# Patient Record
Sex: MALE | Race: WHITE | NOT HISPANIC OR LATINO | URBAN - METROPOLITAN AREA
[De-identification: names, ages, dates, MRNs, and addresses within clinical notes are randomized per-mention and may not be internally consistent; named-entity substitution may affect disease eponyms.]

---

## 2017-08-18 ENCOUNTER — EMERGENCY (EMERGENCY)
Facility: HOSPITAL | Age: 52
LOS: 1 days | Discharge: HOME | End: 2017-08-18
Attending: EMERGENCY MEDICINE | Admitting: EMERGENCY MEDICINE
Payer: COMMERCIAL

## 2017-08-18 VITALS
RESPIRATION RATE: 18 BRPM | WEIGHT: 179.9 LBS | OXYGEN SATURATION: 98 % | DIASTOLIC BLOOD PRESSURE: 109 MMHG | TEMPERATURE: 98 F | HEART RATE: 90 BPM | SYSTOLIC BLOOD PRESSURE: 193 MMHG

## 2017-08-18 VITALS
DIASTOLIC BLOOD PRESSURE: 90 MMHG | HEART RATE: 66 BPM | SYSTOLIC BLOOD PRESSURE: 150 MMHG | RESPIRATION RATE: 17 BRPM | OXYGEN SATURATION: 97 % | TEMPERATURE: 98 F

## 2017-08-18 LAB
ALBUMIN SERPL ELPH-MCNC: 4.3 G/DL — SIGNIFICANT CHANGE UP (ref 3.3–5)
ALP SERPL-CCNC: 62 U/L — SIGNIFICANT CHANGE UP (ref 40–120)
ALT FLD-CCNC: 25 U/L RC — SIGNIFICANT CHANGE UP (ref 10–45)
ANION GAP SERPL CALC-SCNC: 10 MMOL/L — SIGNIFICANT CHANGE UP (ref 5–17)
AST SERPL-CCNC: 17 U/L — SIGNIFICANT CHANGE UP (ref 10–40)
BASOPHILS # BLD AUTO: 0.1 K/UL — SIGNIFICANT CHANGE UP (ref 0–0.2)
BASOPHILS NFR BLD AUTO: 0.8 % — SIGNIFICANT CHANGE UP (ref 0–2)
BILIRUB SERPL-MCNC: 0.6 MG/DL — SIGNIFICANT CHANGE UP (ref 0.2–1.2)
BUN SERPL-MCNC: 19 MG/DL — SIGNIFICANT CHANGE UP (ref 7–23)
CALCIUM SERPL-MCNC: 9.3 MG/DL — SIGNIFICANT CHANGE UP (ref 8.4–10.5)
CHLORIDE SERPL-SCNC: 98 MMOL/L — SIGNIFICANT CHANGE UP (ref 96–108)
CO2 SERPL-SCNC: 28 MMOL/L — SIGNIFICANT CHANGE UP (ref 22–31)
CREAT SERPL-MCNC: 1.05 MG/DL — SIGNIFICANT CHANGE UP (ref 0.5–1.3)
EOSINOPHIL # BLD AUTO: 0.1 K/UL — SIGNIFICANT CHANGE UP (ref 0–0.5)
EOSINOPHIL NFR BLD AUTO: 1.6 % — SIGNIFICANT CHANGE UP (ref 0–6)
GLUCOSE SERPL-MCNC: 102 MG/DL — HIGH (ref 70–99)
HCT VFR BLD CALC: 45 % — SIGNIFICANT CHANGE UP (ref 39–50)
HGB BLD-MCNC: 15.6 G/DL — SIGNIFICANT CHANGE UP (ref 13–17)
LYMPHOCYTES # BLD AUTO: 2.3 K/UL — SIGNIFICANT CHANGE UP (ref 1–3.3)
LYMPHOCYTES # BLD AUTO: 29.3 % — SIGNIFICANT CHANGE UP (ref 13–44)
MCHC RBC-ENTMCNC: 32.5 PG — SIGNIFICANT CHANGE UP (ref 27–34)
MCHC RBC-ENTMCNC: 34.7 GM/DL — SIGNIFICANT CHANGE UP (ref 32–36)
MCV RBC AUTO: 93.6 FL — SIGNIFICANT CHANGE UP (ref 80–100)
MONOCYTES # BLD AUTO: 0.9 K/UL — SIGNIFICANT CHANGE UP (ref 0–0.9)
MONOCYTES NFR BLD AUTO: 12 % — SIGNIFICANT CHANGE UP (ref 2–14)
NEUTROPHILS # BLD AUTO: 4.4 K/UL — SIGNIFICANT CHANGE UP (ref 1.8–7.4)
NEUTROPHILS NFR BLD AUTO: 56.3 % — SIGNIFICANT CHANGE UP (ref 43–77)
PLATELET # BLD AUTO: 220 K/UL — SIGNIFICANT CHANGE UP (ref 150–400)
POTASSIUM SERPL-MCNC: 4.3 MMOL/L — SIGNIFICANT CHANGE UP (ref 3.5–5.3)
POTASSIUM SERPL-SCNC: 4.3 MMOL/L — SIGNIFICANT CHANGE UP (ref 3.5–5.3)
PROT SERPL-MCNC: 7.4 G/DL — SIGNIFICANT CHANGE UP (ref 6–8.3)
RBC # BLD: 4.81 M/UL — SIGNIFICANT CHANGE UP (ref 4.2–5.8)
RBC # FLD: 10.7 % — SIGNIFICANT CHANGE UP (ref 10.3–14.5)
SODIUM SERPL-SCNC: 136 MMOL/L — SIGNIFICANT CHANGE UP (ref 135–145)
WBC # BLD: 7.9 K/UL — SIGNIFICANT CHANGE UP (ref 3.8–10.5)
WBC # FLD AUTO: 7.9 K/UL — SIGNIFICANT CHANGE UP (ref 3.8–10.5)

## 2017-08-18 PROCEDURE — 93010 ELECTROCARDIOGRAM REPORT: CPT

## 2017-08-18 PROCEDURE — 99283 EMERGENCY DEPT VISIT LOW MDM: CPT | Mod: 25

## 2017-08-18 PROCEDURE — 85027 COMPLETE CBC AUTOMATED: CPT

## 2017-08-18 PROCEDURE — 99284 EMERGENCY DEPT VISIT MOD MDM: CPT | Mod: 25

## 2017-08-18 PROCEDURE — 93005 ELECTROCARDIOGRAM TRACING: CPT

## 2017-08-18 PROCEDURE — 80053 COMPREHEN METABOLIC PANEL: CPT

## 2017-08-18 RX ORDER — LISINOPRIL 2.5 MG/1
10 TABLET ORAL DAILY
Qty: 0 | Refills: 0 | Status: DISCONTINUED | OUTPATIENT
Start: 2017-08-18 | End: 2017-08-22

## 2017-08-18 RX ORDER — AMLODIPINE BESYLATE 2.5 MG/1
1 TABLET ORAL
Qty: 7 | Refills: 0 | OUTPATIENT
Start: 2017-08-18 | End: 2017-08-25

## 2017-08-18 RX ORDER — LISINOPRIL 2.5 MG/1
1 TABLET ORAL
Qty: 7 | Refills: 0 | OUTPATIENT
Start: 2017-08-18 | End: 2017-08-25

## 2017-08-18 RX ORDER — AMLODIPINE BESYLATE 2.5 MG/1
10 TABLET ORAL ONCE
Qty: 0 | Refills: 0 | Status: COMPLETED | OUTPATIENT
Start: 2017-08-18 | End: 2017-08-18

## 2017-08-18 RX ADMIN — LISINOPRIL 10 MILLIGRAM(S): 2.5 TABLET ORAL at 18:54

## 2017-08-18 RX ADMIN — AMLODIPINE BESYLATE 10 MILLIGRAM(S): 2.5 TABLET ORAL at 17:15

## 2017-08-18 NOTE — ED PROVIDER NOTE - MEDICAL DECISION MAKING DETAILS
hypertension without signs of end organ dmg on H/P. Doubt intracranial, cardiac, or renal damage given H/P. will do basic labs, give dose of antihtn meds here, dispo to home with close PCP Follow up.

## 2017-08-18 NOTE — ED PROVIDER NOTE - ATTENDING CONTRIBUTION TO CARE
referred to emergency department by personal medical doctor, noted to have headache, resolved, improved, noted to have high blood pressure, on my exam:  (GENERAL) This is a well developed, well nourished patient who is in no apparent distress.  (HEENT) There is no signs of trauma of the head, neck, chest, or belly. The head is normocephalic.  The outer ears appears normal. (EYES) The sclera is anicteric, conjunctiva is pink, mucous membranes are moist.  (Respiratory)There is no stridor and the patient is moving air throughout the respiratory tract well.  There are no visible masses of the neck.  (CARDIAC) The rate appears to be normal. There is no JVD. (SKIN) The skin is warm and dry. (MUSCULOSKELETAL) The extremities are warm to the touch with good capillary refill and without edema. (PSYCH) The patient is cooperative and pleasant.  (NEURO) There are no obvious focal deformities on neurologic examination.

## 2017-08-18 NOTE — ED ADULT NURSE NOTE - CHPI ED SYMPTOMS NEG
no chills/no vomiting/no syncope/no chest pain/no nausea/no diaphoresis/no shortness of breath/no back pain/no dizziness/no fever/no cough

## 2017-08-18 NOTE — ED PROVIDER NOTE - PROGRESS NOTE DETAILS
- MD Cameron,PhD - Resident: patient reassessed  multiple times, amlodipine was added and later lisinopril, BP improved, will d/c home with close pcp Follow up.

## 2017-08-18 NOTE — ED PROVIDER NOTE - CARE PLAN
Principal Discharge DX:	Headache  Secondary Diagnosis:	Essential hypertension Principal Discharge DX:	Headache  Instructions for follow-up, activity and diet:	You were seen in the ER for hypertension. You must follow up with your primary physician in 24 to 48 hours. Return to the ER for any new or worsening signs/symptoms.   1) Take the antihypertensives as prescribed  Secondary Diagnosis:	Essential hypertension

## 2017-08-18 NOTE — ED PROVIDER NOTE - PLAN OF CARE
You were seen in the ER for hypertension. You must follow up with your primary physician in 24 to 48 hours. Return to the ER for any new or worsening signs/symptoms.   1) Take the antihypertensives as prescribed

## 2017-08-18 NOTE — ED PROVIDER NOTE - OBJECTIVE STATEMENT
51 male history of htn on clonidine here for hypertension. 1 week ago started having migraines, similar in quality but more severe, and associated with HTN. Headache better since returned from [place]. Now no headache. Has never been on BP meds until yesterday, Primary (Enrique Santizo) started him on Clonidine. No changes in urination, no chest pain, no shortness of breath.

## 2017-08-18 NOTE — ED PROVIDER NOTE - PHYSICAL EXAMINATION
Gen: NAD, AOx3, non-toxic // Head: NCAT // HEENT: EOMI, oral mucosa moist, normal conjunctiva // Lung: CTAB, no respiratory distress, no wheezes/rhonchi/rales B/L, speaking in full sentences. // CV: RRR, no murmurs, rubs or gallops // Abd: soft, NTND, no guarding, no CVA tenderness // MSK: no visible deformities // Neuro: No focal sensory or motor deficits // Skin: Warm, well perfused, no rash // Psych: normal affect. ~Coby Barnes M.D., Ph.D. -Resident

## 2017-08-18 NOTE — ED ADULT NURSE NOTE - OBJECTIVE STATEMENT
51 male history of htn on clonidine here for hypertension. 1 week ago started having migraines, similar in quality but more severe, and associated with HTN. Headache better since returned from [place]. Now no headache. Has never been on BP meds until yesterday, Primary (Enrique Santizo) started him on Clonidine. No changes in urination, no chest pain, no shortness of breath.

## 2017-09-05 ENCOUNTER — INPATIENT (INPATIENT)
Facility: HOSPITAL | Age: 52
LOS: 3 days | Discharge: HOME | DRG: 948 | End: 2017-09-09
Attending: INTERNAL MEDICINE | Admitting: INTERNAL MEDICINE
Payer: COMMERCIAL

## 2017-09-05 VITALS
SYSTOLIC BLOOD PRESSURE: 149 MMHG | TEMPERATURE: 98 F | OXYGEN SATURATION: 99 % | HEART RATE: 91 BPM | RESPIRATION RATE: 16 BRPM | DIASTOLIC BLOOD PRESSURE: 98 MMHG | WEIGHT: 179.9 LBS

## 2017-09-05 DIAGNOSIS — E89.6 POSTPROCEDURAL ADRENOCORTICAL (-MEDULLARY) HYPOFUNCTION: Chronic | ICD-10-CM

## 2017-09-05 DIAGNOSIS — Z90.49 ACQUIRED ABSENCE OF OTHER SPECIFIED PARTS OF DIGESTIVE TRACT: Chronic | ICD-10-CM

## 2017-09-05 DIAGNOSIS — R53.1 WEAKNESS: ICD-10-CM

## 2017-09-05 LAB
ALBUMIN SERPL ELPH-MCNC: 4.5 G/DL — SIGNIFICANT CHANGE UP (ref 3.3–5)
ALP SERPL-CCNC: 80 U/L — SIGNIFICANT CHANGE UP (ref 40–120)
ALT FLD-CCNC: 53 U/L RC — HIGH (ref 10–45)
ANION GAP SERPL CALC-SCNC: 15 MMOL/L — SIGNIFICANT CHANGE UP (ref 5–17)
APPEARANCE UR: CLEAR — SIGNIFICANT CHANGE UP
APTT BLD: 22 SEC — LOW (ref 27.5–37.4)
AST SERPL-CCNC: 32 U/L — SIGNIFICANT CHANGE UP (ref 10–40)
BASOPHILS # BLD AUTO: 0.1 K/UL — SIGNIFICANT CHANGE UP (ref 0–0.2)
BASOPHILS NFR BLD AUTO: 0.6 % — SIGNIFICANT CHANGE UP (ref 0–2)
BILIRUB SERPL-MCNC: 0.6 MG/DL — SIGNIFICANT CHANGE UP (ref 0.2–1.2)
BILIRUB UR-MCNC: NEGATIVE — SIGNIFICANT CHANGE UP
BUN SERPL-MCNC: 29 MG/DL — HIGH (ref 7–23)
CALCIUM SERPL-MCNC: 10.5 MG/DL — SIGNIFICANT CHANGE UP (ref 8.4–10.5)
CHLORIDE SERPL-SCNC: 90 MMOL/L — LOW (ref 96–108)
CO2 SERPL-SCNC: 23 MMOL/L — SIGNIFICANT CHANGE UP (ref 22–31)
COLOR SPEC: YELLOW — SIGNIFICANT CHANGE UP
CREAT SERPL-MCNC: 1.62 MG/DL — HIGH (ref 0.5–1.3)
DIFF PNL FLD: NEGATIVE — SIGNIFICANT CHANGE UP
EOSINOPHIL # BLD AUTO: 0.1 K/UL — SIGNIFICANT CHANGE UP (ref 0–0.5)
EOSINOPHIL NFR BLD AUTO: 0.5 % — SIGNIFICANT CHANGE UP (ref 0–6)
EPI CELLS # UR: SIGNIFICANT CHANGE UP /HPF
GAS PNL BLDV: SIGNIFICANT CHANGE UP
GLUCOSE SERPL-MCNC: 118 MG/DL — HIGH (ref 70–99)
GLUCOSE UR QL: NEGATIVE — SIGNIFICANT CHANGE UP
HCT VFR BLD CALC: 49.8 % — SIGNIFICANT CHANGE UP (ref 39–50)
HGB BLD-MCNC: 17.7 G/DL — HIGH (ref 13–17)
HYALINE CASTS # UR AUTO: ABNORMAL
INR BLD: 1.12 RATIO — SIGNIFICANT CHANGE UP (ref 0.88–1.16)
KETONES UR-MCNC: NEGATIVE — SIGNIFICANT CHANGE UP
LEUKOCYTE ESTERASE UR-ACNC: NEGATIVE — SIGNIFICANT CHANGE UP
LYMPHOCYTES # BLD AUTO: 1.4 K/UL — SIGNIFICANT CHANGE UP (ref 1–3.3)
LYMPHOCYTES # BLD AUTO: 9 % — LOW (ref 13–44)
MCHC RBC-ENTMCNC: 32.5 PG — SIGNIFICANT CHANGE UP (ref 27–34)
MCHC RBC-ENTMCNC: 35.7 GM/DL — SIGNIFICANT CHANGE UP (ref 32–36)
MCV RBC AUTO: 91.3 FL — SIGNIFICANT CHANGE UP (ref 80–100)
MONOCYTES # BLD AUTO: 1.7 K/UL — HIGH (ref 0–0.9)
MONOCYTES NFR BLD AUTO: 11 % — SIGNIFICANT CHANGE UP (ref 2–14)
NEUTROPHILS # BLD AUTO: 12.2 K/UL — HIGH (ref 1.8–7.4)
NEUTROPHILS NFR BLD AUTO: 78.9 % — HIGH (ref 43–77)
NITRITE UR-MCNC: NEGATIVE — SIGNIFICANT CHANGE UP
PH UR: 6 — SIGNIFICANT CHANGE UP (ref 5–8)
PLATELET # BLD AUTO: 246 K/UL — SIGNIFICANT CHANGE UP (ref 150–400)
POTASSIUM SERPL-MCNC: 5.3 MMOL/L — SIGNIFICANT CHANGE UP (ref 3.5–5.3)
POTASSIUM SERPL-SCNC: 5.3 MMOL/L — SIGNIFICANT CHANGE UP (ref 3.5–5.3)
PROT SERPL-MCNC: 8.8 G/DL — HIGH (ref 6–8.3)
PROT UR-MCNC: 30 MG/DL
PROTHROM AB SERPL-ACNC: 12.1 SEC — SIGNIFICANT CHANGE UP (ref 9.8–12.7)
RBC # BLD: 5.45 M/UL — SIGNIFICANT CHANGE UP (ref 4.2–5.8)
RBC # FLD: 10.3 % — SIGNIFICANT CHANGE UP (ref 10.3–14.5)
RBC CASTS # UR COMP ASSIST: SIGNIFICANT CHANGE UP /HPF (ref 0–2)
SODIUM SERPL-SCNC: 128 MMOL/L — LOW (ref 135–145)
SP GR SPEC: 1.02 — SIGNIFICANT CHANGE UP (ref 1.01–1.02)
UROBILINOGEN FLD QL: NEGATIVE — SIGNIFICANT CHANGE UP
WBC # BLD: 15.5 K/UL — HIGH (ref 3.8–10.5)
WBC # FLD AUTO: 15.5 K/UL — HIGH (ref 3.8–10.5)
WBC UR QL: SIGNIFICANT CHANGE UP /HPF (ref 0–5)

## 2017-09-05 PROCEDURE — 99285 EMERGENCY DEPT VISIT HI MDM: CPT | Mod: 25

## 2017-09-05 PROCEDURE — 71010: CPT | Mod: 26

## 2017-09-05 PROCEDURE — 93010 ELECTROCARDIOGRAM REPORT: CPT

## 2017-09-05 PROCEDURE — 70450 CT HEAD/BRAIN W/O DYE: CPT | Mod: 26

## 2017-09-05 RX ORDER — SPIRONOLACTONE 25 MG/1
25 TABLET, FILM COATED ORAL DAILY
Qty: 0 | Refills: 0 | Status: DISCONTINUED | OUTPATIENT
Start: 2017-09-05 | End: 2017-09-05

## 2017-09-05 RX ORDER — SODIUM CHLORIDE 9 MG/ML
1000 INJECTION INTRAMUSCULAR; INTRAVENOUS; SUBCUTANEOUS ONCE
Qty: 0 | Refills: 0 | Status: COMPLETED | OUTPATIENT
Start: 2017-09-05 | End: 2017-09-05

## 2017-09-05 RX ORDER — IBUPROFEN 200 MG
3 TABLET ORAL
Qty: 0 | Refills: 0 | COMMUNITY

## 2017-09-05 RX ORDER — ACETAMINOPHEN 500 MG
650 TABLET ORAL ONCE
Qty: 0 | Refills: 0 | Status: COMPLETED | OUTPATIENT
Start: 2017-09-05 | End: 2017-09-05

## 2017-09-05 RX ORDER — HYDROCHLOROTHIAZIDE 25 MG
0 TABLET ORAL
Qty: 0 | Refills: 0 | COMMUNITY

## 2017-09-05 RX ORDER — SPIRONOLACTONE 25 MG/1
0 TABLET, FILM COATED ORAL
Qty: 0 | Refills: 0 | COMMUNITY

## 2017-09-05 RX ORDER — HYDRALAZINE HCL 50 MG
10 TABLET ORAL ONCE
Qty: 0 | Refills: 0 | Status: COMPLETED | OUTPATIENT
Start: 2017-09-05 | End: 2017-09-05

## 2017-09-05 RX ORDER — LISINOPRIL 2.5 MG/1
5 TABLET ORAL DAILY
Qty: 0 | Refills: 0 | Status: DISCONTINUED | OUTPATIENT
Start: 2017-09-05 | End: 2017-09-05

## 2017-09-05 RX ORDER — AMLODIPINE BESYLATE 2.5 MG/1
5 TABLET ORAL ONCE
Qty: 0 | Refills: 0 | Status: COMPLETED | OUTPATIENT
Start: 2017-09-05 | End: 2017-09-05

## 2017-09-05 RX ORDER — SPIRONOLACTONE 25 MG/1
50 TABLET, FILM COATED ORAL
Qty: 0 | Refills: 0 | Status: DISCONTINUED | OUTPATIENT
Start: 2017-09-05 | End: 2017-09-09

## 2017-09-05 RX ORDER — ACETAMINOPHEN 500 MG
1000 TABLET ORAL ONCE
Qty: 0 | Refills: 0 | Status: COMPLETED | OUTPATIENT
Start: 2017-09-05 | End: 2017-09-05

## 2017-09-05 RX ORDER — ASPIRIN/CALCIUM CARB/MAGNESIUM 324 MG
325 TABLET ORAL ONCE
Qty: 0 | Refills: 0 | Status: COMPLETED | OUTPATIENT
Start: 2017-09-05 | End: 2017-09-05

## 2017-09-05 RX ORDER — SPIRONOLACTONE 25 MG/1
2 TABLET, FILM COATED ORAL
Qty: 0 | Refills: 0 | COMMUNITY

## 2017-09-05 RX ORDER — HEPARIN SODIUM 5000 [USP'U]/ML
5000 INJECTION INTRAVENOUS; SUBCUTANEOUS EVERY 12 HOURS
Qty: 0 | Refills: 0 | Status: DISCONTINUED | OUTPATIENT
Start: 2017-09-05 | End: 2017-09-09

## 2017-09-05 RX ORDER — METOCLOPRAMIDE HCL 10 MG
10 TABLET ORAL ONCE
Qty: 0 | Refills: 0 | Status: COMPLETED | OUTPATIENT
Start: 2017-09-05 | End: 2017-09-05

## 2017-09-05 RX ORDER — AMLODIPINE BESYLATE 2.5 MG/1
5 TABLET ORAL DAILY
Qty: 0 | Refills: 0 | Status: DISCONTINUED | OUTPATIENT
Start: 2017-09-06 | End: 2017-09-06

## 2017-09-05 RX ORDER — INFLUENZA VIRUS VACCINE 15; 15; 15; 15 UG/.5ML; UG/.5ML; UG/.5ML; UG/.5ML
0.5 SUSPENSION INTRAMUSCULAR ONCE
Qty: 0 | Refills: 0 | Status: COMPLETED | OUTPATIENT
Start: 2017-09-05 | End: 2017-09-05

## 2017-09-05 RX ORDER — ROSUVASTATIN CALCIUM 5 MG/1
1 TABLET ORAL
Qty: 0 | Refills: 0 | COMMUNITY

## 2017-09-05 RX ADMIN — Medication 1000 MILLIGRAM(S): at 11:30

## 2017-09-05 RX ADMIN — Medication 650 MILLIGRAM(S): at 23:00

## 2017-09-05 RX ADMIN — SPIRONOLACTONE 50 MILLIGRAM(S): 25 TABLET, FILM COATED ORAL at 17:51

## 2017-09-05 RX ADMIN — AMLODIPINE BESYLATE 5 MILLIGRAM(S): 2.5 TABLET ORAL at 17:48

## 2017-09-05 RX ADMIN — HEPARIN SODIUM 5000 UNIT(S): 5000 INJECTION INTRAVENOUS; SUBCUTANEOUS at 17:48

## 2017-09-05 RX ADMIN — Medication 650 MILLIGRAM(S): at 22:17

## 2017-09-05 RX ADMIN — Medication 10 MILLIGRAM(S): at 10:41

## 2017-09-05 RX ADMIN — Medication 400 MILLIGRAM(S): at 10:46

## 2017-09-05 RX ADMIN — SODIUM CHLORIDE 1000 MILLILITER(S): 9 INJECTION INTRAMUSCULAR; INTRAVENOUS; SUBCUTANEOUS at 10:40

## 2017-09-05 RX ADMIN — Medication 10 MILLIGRAM(S): at 22:17

## 2017-09-05 RX ADMIN — Medication 325 MILLIGRAM(S): at 17:42

## 2017-09-05 NOTE — H&P ADULT - REASON FOR ADMISSION
"SPIRITUAL HEALTH SERVICES  SPIRITUAL ASSESSMENT Progress Note  [facility] ([place]) 5C    Per pt request, provided a blessing service.  Just prior to Steph receiving her cells, facilitated a blessing along with her cousin, April.  As we closed, Steph said \"now it's in God's hands.\"  Introduced the 24/7 availability of [facility] and the plan for periodic follow-up.      Will visit this pt 1x / wk.  Visits are available on request.    Mario Donnelly M.Div.  Staff   Pager [phone]    " weakness

## 2017-09-05 NOTE — H&P ADULT - NSHPLABSRESULTS_GEN_ALL_CORE
17.7   15.5  )-----------( 246      ( 05 Sep 2017 10:24 )             49.8           128<L>  |  90<L>  |  29<H>  ----------------------------<  118<H>  5.3   |  23  |  1.62<H>    Ca    10.5      05 Sep 2017 10:24  Phos  3.0       Mg     1.8         TPro  8.8<H>  /  Alb  4.5  /  TBili  0.6  /  DBili  x   /  AST  32  /  ALT  53<H>  /  AlkPhos  80                Urinalysis Basic - ( 05 Sep 2017 13:38 )    Color: Yellow / Appearance: Clear / S.025 / pH: x  Gluc: x / Ketone: Negative  / Bili: Negative / Urobili: Negative   Blood: x / Protein: 30 mg/dL / Nitrite: Negative   Leuk Esterase: Negative / RBC: 3-5 /HPF / WBC 6-10 /HPF   Sq Epi: x / Non Sq Epi: OCC /HPF / Bacteria: x        PT/INR - ( 05 Sep 2017 10:24 )   PT: 12.1 sec;   INR: 1.12 ratio         PTT - ( 05 Sep 2017 10:24 )  PTT:22.0 sec    Lactate Trend      CARDIAC MARKERS ( 05 Sep 2017 10:24 )  x     / <0.01 ng/mL / 85 U/L / x     / 1.8 ng/mL        CAPILLARY BLOOD GLUCOSE  109 (05 Sep 2017 10:24)  < from: Xray Chest 1 View AP- PORTABLE-Urgent (17 @ 15:30) >        INTERPRETATION:  CLINICAL INFORMATION: Weakness and fatigue.    EXAM: Single frontal view chest radiograph.    COMPARISON:  None    FINDINGS:   LINES/TUBES AND POSTSURGICAL CHANGES:  There are scattered surgical clips in the left upper quadrant.    HEART AND MEDIASTINUM:  The cardiac silhouette cannot be accurately evaluated on this projection.    LUNGS/PLEURA:  Clear lungs.  No pneumothorax.   There is no pleural effusion.     OSSEOUS STRUCTURES:  No acute osseous pathology.      IMPRESSION:   Clear lungs.    < end of copied text >

## 2017-09-05 NOTE — H&P ADULT - ASSESSMENT
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

## 2017-09-05 NOTE — ED ADULT NURSE REASSESSMENT NOTE - NS ED NURSE REASSESS COMMENT FT1
During Attending MD Pedro assessment, patient suddenly became lethargic, diaphoretic and stated he doesn't feel right. Patient vomited x3 while MD Pedro at bedside. Patient revitaled, EKG performed, finger stick done.

## 2017-09-05 NOTE — ED PROVIDER NOTE - MEDICAL DECISION MAKING DETAILS
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 no focal symptoms and pt already with lengthy workup including CT. will check basic labs, give fluids, and headache cocktail. will attempt to get read on outpatient CT. reassess.

## 2017-09-05 NOTE — ED PROVIDER NOTE - PHYSICAL EXAMINATION
Karen Forbes M.D.:   patient awake alert seen lying on stretcher tired appearing NAD .   LUNGS Coarse breath sounds b/l..   CARD RRR no m/r/g.    Abdomen soft NT ND no rebound no guarding no CVA tenderness.   EXT WWP no edema no calf tenderness CV 2+DP/PT bilaterally.   neuro A&Ox3 CN 2-12 intact, gross motor and sensory intact in b/l UE and LE. +horizontal nystagmus  b/l gait normal.    skin warm and dry no rash  HEENT: moist mucous membranes, PERRL, EOMI

## 2017-09-05 NOTE — ED PROVIDER NOTE - OBJECTIVE STATEMENT
Karen Forbes M.D: 52yoM hx Conn syndrome, p/w weakness. started with HTN SBP 200s one month ago. started on BP meds. presented to ER 2 weeks ago for still feeling generally unwell started on more medicine.  saw endocrinologist last week got blood work and CT on saturday, was diagnosed with Conn syndrome prior to results of these tests- started on spironolactone. over the last days increasing fatigue, weakness, foggy, lightheaded. +HA. no vision change. no cp, sob. +nausea. no vomiting/diarrhea/abd pain.

## 2017-09-05 NOTE — ED ADULT NURSE NOTE - OBJECTIVE STATEMENT
53 y/o male patient presents ambulatory to ED with complaint of generalized body weakness, headache and nausea. Patient recently seen in ED for HTN, followed up with PMD dx with "Conn Syndrome", started on spironolactone and HCTZ. Patient states he has a follow up with PMD this evening but wasn't feeling well at work. Per patient he had bloodwork and CT scan perfomred 2 days ago outpatient- unknown results. Patient denies SOB and CP, no V/D, afebrile in ED, no lightheadedness/dizziness, no numbness or tingling, no abdominal pain or tenderness, no syncope, no cough, no URI

## 2017-09-05 NOTE — H&P ADULT - NSHPPHYSICALEXAM_GEN_ALL_CORE
PHYSICAL EXAMINATION:  Vital Signs Last 24 Hrs  T(C): 36.4 (05 Sep 2017 09:38), Max: 36.4 (05 Sep 2017 09:38)  T(F): 97.6 (05 Sep 2017 09:38), Max: 97.6 (05 Sep 2017 09:38)  HR: 74 (05 Sep 2017 13:04) (74 - 92)  BP: 158/100 (05 Sep 2017 13:04) (118/93 - 168/113)  BP(mean): --  RR: 17 (05 Sep 2017 13:04) (16 - 17)  SpO2: 97% (05 Sep 2017 13:04) (97% - 99%)  CAPILLARY BLOOD GLUCOSE  109 (05 Sep 2017 10:24)          GENERAL: NAD, well-groomed, well-developed  HEAD:  atraumatic, normocephalic  EYES: sclera anicteric  ENMT: mucous membranes moist  NECK: supple, No JVD  CHEST/LUNG: clear to auscultation bilaterally; no rales, rhonchi, or wheezing b/l  HEART: normal S1, S2  ABDOMEN: BS+, soft, ND, NT   EXTREMITIES:  pulses palpable; no clubbing, cyanosis, or edema b/l LEs  NEURO: awake, alert, interactive; moves all extremities  SKIN: no rashes or lesions

## 2017-09-05 NOTE — H&P ADULT - NSHPSOCIALHISTORY_GEN_ALL_CORE
Social History:    Marital Status:  ( x )    (   ) Single    (   )    (  )   Occupation:   Lives with: (  ) alone  (  ) children   (  ) spouse   (  ) parents  (  ) other    Substance Use (street drugs): ( x ) never used  (  ) other:  Tobacco Usage:  (  x ) never smoked   (   ) former smoker   (   ) current smoker  (     ) pack years  (        ) last cigarette date  Alcohol Usage: denies    (     ) Advanced Directives: (     ) None    (      ) DNR    (     ) DNI    (     ) Health Care Proxy:

## 2017-09-05 NOTE — H&P ADULT - NSHPREVIEWOFSYSTEMS_GEN_ALL_CORE
REVIEW OF SYSTEMS:    CONSTITUTIONAL: + weakness, no fevers or chills  EYES/ENT: No visual changes;  No vertigo or throat pain   NECK: No pain or stiffness  RESPIRATORY: No cough, wheezing, hemoptysis; No shortness of breath  CARDIOVASCULAR: No chest pain or palpitations  GASTROINTESTINAL: No abdominal or epigastric pain. No nausea, vomiting, or hematemesis; No diarrhea or constipation. No melena or hematochezia.  GENITOURINARY: No dysuria, frequency or hematuria  NEUROLOGICAL: No numbness or weakness  SKIN: No itching, burning, rashes, or lesions   All other review of systems is negative unless indicated above.

## 2017-09-05 NOTE — ED PROVIDER NOTE - ATTENDING CONTRIBUTION TO CARE
52M hx htn hld Conn syndrome migraines presents to the ED for weakness. Pt has been feeling weak for the past few days. generalized weakness. associated headache and dizziness - feels off balance. no f/c/cp/sob/abd pain. While examining patient in room became diaphoretic and multiple episodes of vomiting. Concern for lab abnormalities vs vertigo - will obtain labs ct head ekg symptom control and reassess    Constitutional: No fever or chills  Eyes: No visual changes  HEENT: No throat pain  CV: No chest pain  Resp: No SOB no cough  GI: No abd pain, + nausea and vomiting  : No dysuria  MSK: No musculoskeletal pain  Skin: No rash  Neuro: + headache + dizziness + weakness    Constitutional: mild distress AAOx3  Eyes: PERRLA EOMI  Head: Normocephalic atraumatic  Mouth: MMM  Cardiac: regular rate   Resp: Lungs CTAB  GI: Abd s/nt/nd  Neuro: CN2-12 intact mild horizontal nystagmus fatigable - no disdiadokokenesis normal strength and sensation.   Skin: No rashes     Ben Pedro M.D., Attending Physician

## 2017-09-05 NOTE — H&P ADULT - HISTORY OF PRESENT ILLNESS
52yoM hx Conn syndrome, p/w weakness. started with HTN SBP 200s one month ago. started on BP meds. presented to ER 2 weeks ago for still feeling generally unwell started on more medicine.  saw endocrinologist last week got blood work and CT on saturday, was diagnosed with Conn syndrome prior to results of these tests- started on spironolactone. over the last days increasing fatigue, weakness, foggy, lightheaded. +HA. no vision change. no cp, sob. +nausea. no vomiting/diarrhea/abd pain.

## 2017-09-06 ENCOUNTER — TRANSCRIPTION ENCOUNTER (OUTPATIENT)
Age: 52
End: 2017-09-06

## 2017-09-06 DIAGNOSIS — E78.00 PURE HYPERCHOLESTEROLEMIA, UNSPECIFIED: ICD-10-CM

## 2017-09-06 DIAGNOSIS — I15.2 HYPERTENSION SECONDARY TO ENDOCRINE DISORDERS: ICD-10-CM

## 2017-09-06 DIAGNOSIS — E87.1 HYPO-OSMOLALITY AND HYPONATREMIA: ICD-10-CM

## 2017-09-06 LAB
ACTH SER-ACNC: 40 PG/ML — SIGNIFICANT CHANGE UP (ref 0–46)
ALBUMIN SERPL ELPH-MCNC: 4.4 G/DL — SIGNIFICANT CHANGE UP (ref 3.3–5)
ALP SERPL-CCNC: 79 U/L — SIGNIFICANT CHANGE UP (ref 40–120)
ALT FLD-CCNC: 41 U/L RC — SIGNIFICANT CHANGE UP (ref 10–45)
ANION GAP SERPL CALC-SCNC: 17 MMOL/L — SIGNIFICANT CHANGE UP (ref 5–17)
AST SERPL-CCNC: 22 U/L — SIGNIFICANT CHANGE UP (ref 10–40)
BASOPHILS # BLD AUTO: 0.1 K/UL — SIGNIFICANT CHANGE UP (ref 0–0.2)
BASOPHILS NFR BLD AUTO: 0.5 % — SIGNIFICANT CHANGE UP (ref 0–2)
BILIRUB SERPL-MCNC: 0.6 MG/DL — SIGNIFICANT CHANGE UP (ref 0.2–1.2)
BUN SERPL-MCNC: 29 MG/DL — HIGH (ref 7–23)
CALCIUM SERPL-MCNC: 10.5 MG/DL — SIGNIFICANT CHANGE UP (ref 8.4–10.5)
CHLORIDE SERPL-SCNC: 92 MMOL/L — LOW (ref 96–108)
CO2 SERPL-SCNC: 23 MMOL/L — SIGNIFICANT CHANGE UP (ref 22–31)
CORTIS AM PEAK SERPL-MCNC: 18.5 UG/DL — HIGH (ref 6–18.4)
CREAT SERPL-MCNC: 1.67 MG/DL — HIGH (ref 0.5–1.3)
EOSINOPHIL # BLD AUTO: 0 K/UL — SIGNIFICANT CHANGE UP (ref 0–0.5)
EOSINOPHIL NFR BLD AUTO: 0.2 % — SIGNIFICANT CHANGE UP (ref 0–6)
GLUCOSE SERPL-MCNC: 106 MG/DL — HIGH (ref 70–99)
HCT VFR BLD CALC: 50.5 % — HIGH (ref 39–50)
HGB BLD-MCNC: 18.1 G/DL — HIGH (ref 13–17)
LYMPHOCYTES # BLD AUTO: 1.9 K/UL — SIGNIFICANT CHANGE UP (ref 1–3.3)
LYMPHOCYTES # BLD AUTO: 13.5 % — SIGNIFICANT CHANGE UP (ref 13–44)
MCHC RBC-ENTMCNC: 32.9 PG — SIGNIFICANT CHANGE UP (ref 27–34)
MCHC RBC-ENTMCNC: 35.8 GM/DL — SIGNIFICANT CHANGE UP (ref 32–36)
MCV RBC AUTO: 91.8 FL — SIGNIFICANT CHANGE UP (ref 80–100)
MONOCYTES # BLD AUTO: 1.6 K/UL — HIGH (ref 0–0.9)
MONOCYTES NFR BLD AUTO: 11.5 % — SIGNIFICANT CHANGE UP (ref 2–14)
NEUTROPHILS # BLD AUTO: 10.3 K/UL — HIGH (ref 1.8–7.4)
NEUTROPHILS NFR BLD AUTO: 74.3 % — SIGNIFICANT CHANGE UP (ref 43–77)
PLATELET # BLD AUTO: 273 K/UL — SIGNIFICANT CHANGE UP (ref 150–400)
POTASSIUM SERPL-MCNC: 4.4 MMOL/L — SIGNIFICANT CHANGE UP (ref 3.5–5.3)
POTASSIUM SERPL-SCNC: 4.4 MMOL/L — SIGNIFICANT CHANGE UP (ref 3.5–5.3)
PROT SERPL-MCNC: 8.5 G/DL — HIGH (ref 6–8.3)
RBC # BLD: 5.5 M/UL — SIGNIFICANT CHANGE UP (ref 4.2–5.8)
RBC # FLD: 10.6 % — SIGNIFICANT CHANGE UP (ref 10.3–14.5)
SODIUM SERPL-SCNC: 132 MMOL/L — LOW (ref 135–145)
WBC # BLD: 13.9 K/UL — HIGH (ref 3.8–10.5)
WBC # FLD AUTO: 13.9 K/UL — HIGH (ref 3.8–10.5)

## 2017-09-06 PROCEDURE — 99253 IP/OBS CNSLTJ NEW/EST LOW 45: CPT | Mod: GC

## 2017-09-06 RX ORDER — ATORVASTATIN CALCIUM 80 MG/1
80 TABLET, FILM COATED ORAL AT BEDTIME
Qty: 0 | Refills: 0 | Status: DISCONTINUED | OUTPATIENT
Start: 2017-09-06 | End: 2017-09-09

## 2017-09-06 RX ORDER — ACETAMINOPHEN 500 MG
650 TABLET ORAL ONCE
Qty: 0 | Refills: 0 | Status: COMPLETED | OUTPATIENT
Start: 2017-09-06 | End: 2017-09-06

## 2017-09-06 RX ORDER — AMLODIPINE BESYLATE 2.5 MG/1
10 TABLET ORAL DAILY
Qty: 0 | Refills: 0 | Status: DISCONTINUED | OUTPATIENT
Start: 2017-09-06 | End: 2017-09-09

## 2017-09-06 RX ORDER — ACETAMINOPHEN 500 MG
650 TABLET ORAL EVERY 6 HOURS
Qty: 0 | Refills: 0 | Status: DISCONTINUED | OUTPATIENT
Start: 2017-09-06 | End: 2017-09-09

## 2017-09-06 RX ORDER — DEXAMETHASONE 0.5 MG/5ML
1 ELIXIR ORAL ONCE
Qty: 0 | Refills: 0 | Status: COMPLETED | OUTPATIENT
Start: 2017-09-06 | End: 2017-09-06

## 2017-09-06 RX ADMIN — Medication 650 MILLIGRAM(S): at 15:51

## 2017-09-06 RX ADMIN — HEPARIN SODIUM 5000 UNIT(S): 5000 INJECTION INTRAVENOUS; SUBCUTANEOUS at 06:27

## 2017-09-06 RX ADMIN — ATORVASTATIN CALCIUM 80 MILLIGRAM(S): 80 TABLET, FILM COATED ORAL at 23:40

## 2017-09-06 RX ADMIN — SPIRONOLACTONE 50 MILLIGRAM(S): 25 TABLET, FILM COATED ORAL at 06:39

## 2017-09-06 RX ADMIN — Medication 1 MILLIGRAM(S): at 23:38

## 2017-09-06 RX ADMIN — AMLODIPINE BESYLATE 10 MILLIGRAM(S): 2.5 TABLET ORAL at 14:11

## 2017-09-06 RX ADMIN — Medication 650 MILLIGRAM(S): at 10:24

## 2017-09-06 RX ADMIN — Medication 650 MILLIGRAM(S): at 14:51

## 2017-09-06 RX ADMIN — HEPARIN SODIUM 5000 UNIT(S): 5000 INJECTION INTRAVENOUS; SUBCUTANEOUS at 18:24

## 2017-09-06 RX ADMIN — AMLODIPINE BESYLATE 5 MILLIGRAM(S): 2.5 TABLET ORAL at 06:27

## 2017-09-06 RX ADMIN — Medication 650 MILLIGRAM(S): at 09:24

## 2017-09-06 RX ADMIN — SPIRONOLACTONE 50 MILLIGRAM(S): 25 TABLET, FILM COATED ORAL at 18:24

## 2017-09-06 NOTE — CONSULT NOTE ADULT - SUBJECTIVE AND OBJECTIVE BOX
[facility]  [phone]  NEPHROLOGY      INITIAL CONSULT NOTE  --------------------------------------------------------------------------------  HPI:    HPI:  52yoM hx Conn syndrome, p/w weakness. started with HTN SBP 200s one month ago. started on BP meds. presented to ER 2 weeks ago for still feeling generally ace-I and CCB added.  saw endocrinologist last week got blood work and CT on saturday with iv contrast per pt, was diagnosed with Conn syndrome prior to results of these tests- started on spironolactone.       PAST HISTORY  --------------------------------------------------------------------------------  PAST MEDICAL & SURGICAL HISTORY:  Migraines  High cholesterol  Conn syndrome  Essential hypertension  History of appendectomy  History of partial adrenalectomy    FAMILY HISTORY:  no hx of thyroid or pancreatic cancer    PAST SOCIAL HISTORY: no tobacco    ALLERGIES & MEDICATIONS  --------------------------------------------------------------------------------  Allergies    No Known Allergies    Intolerances      Standing Inpatient Medications  heparin  Injectable 5000 Unit(s) SubCutaneous every 12 hours  spironolactone 50 milliGRAM(s) Oral two times a day  dexamethasone     Tablet 1 milliGRAM(s) Oral once  amLODIPine   Tablet 10 milliGRAM(s) Oral daily  atorvastatin 80 milliGRAM(s) Oral at bedtime    PRN Inpatient Medications  acetaminophen   Tablet. 650 milliGRAM(s) Oral every 6 hours PRN      REVIEW OF SYSTEMS  --------------------------------------------------------------------------------  Gen: +fatigue, fevers/chills,   Skin: No rashes  Head/Eyes/Ears/Mouth: currently No headache; Normal hearing; Normal vision w/o blurriness; No sinus pain/discomfort, sore throat  Respiratory: No dyspnea, cough, wheezing, hemoptysis  CV: No chest pain, PND, orthopnea  GI: No abdominal pain, diarrhea, constipation, nausea, vomiting, melena, hematochezia  : No increased frequency, dysuria, hematuria, nocturia  Neuro: PTA + dizziness/lightheadedness, weakness,- seizures, -numbness, - tingling  Heme: No easy bruising or bleeding      All other systems were reviewed and are negative, except as noted.    VITALS/PHYSICAL EXAM  --------------------------------------------------------------------------------  T(C): 36.8 (09-06-17 @ 21:17), Max: 37.1 (09-06-17 @ 09:03)  HR: 77 (09-06-17 @ 21:17) (77 - 99)  BP: 144/97 (09-06-17 @ 21:17) (144/97 - 180/79)  RR: 17 (09-06-17 @ 21:17) (17 - 18)  SpO2: 97% (09-06-17 @ 21:17) (95% - 97%)  Wt(kg): --  Height (cm): 175.26 (09-05-17 @ 18:37)  Weight (kg): 81.6 (09-05-17 @ 18:37)  BMI (kg/m2): 26.6 (09-05-17 @ 18:37)  BSA (m2): 1.97 (09-05-17 @ 18:37)      09-05-17 @ 07:01  -  09-06-17 @ 07:00  --------------------------------------------------------  IN: 200 mL / OUT: 300 mL / NET: -100 mL    09-06-17 @ 07:01  -  09-06-17 @ 22:54  --------------------------------------------------------  IN: 980 mL / OUT: 0 mL / NET: 980 mL      Physical Exam:  	Gen: NAD, well-appearing  	no jvd, supple neck,   	Pulm: CTA B/L no rales or ronchi  	CV: RRR, S1S2; no rub  	Abd: +BS, soft, nontender/nondistended  	: No suprapubic tenderness  	LE: Warm, no clubbing, intact strength; no edema  	Neuro: No focal deficits, intact gait  	Psych: Normal affect and mood  	  LABS/STUDIES  --------------------------------------------------------------------------------              18.1   13.9  >-----------<  273      [09-06-17 @ 11:52]              50.5     132  |  92  |  29  ----------------------------<  106      [09-06-17 @ 11:52]  4.4   |  23  |  1.67        Ca     10.5     [09-06-17 @ 11:52]      Mg     1.8     [09-05-17 @ 10:24]      Phos  3.0     [09-05-17 @ 10:24]    TPro  8.5  /  Alb  4.4  /  TBili  0.6  /  DBili  x   /  AST  22  /  ALT  41  /  AlkPhos  79  [09-06-17 @ 11:52]    PT/INR: PT 12.1 , INR 1.12       [09-05-17 @ 10:24]  PTT: 22.0       [09-05-17 @ 10:24]    Troponin <0.01      [09-05-17 @ 10:24]  CK 85      [09-05-17 @ 10:24]    Creatinine Trend:  SCr 1.67 [09-06 @ 11:52]  SCr 1.62 [09-05 @ 10:24]  SCr 1.05 [08-18 @ 17:20]    Urinalysis - [09-05-17 @ 13:38]      Color Yellow / Appearance Clear / SG 1.025 / pH 6.0      Gluc Negative / Ketone Negative  / Bili Negative / Urobili Negative       Blood Negative / Protein 30 / Leuk Est Negative / Nitrite Negative      RBC 3-5 / WBC 6-10 / Hyaline 2-5 / Gran  / Sq Epi  / Non Sq Epi OCC / Bacteria

## 2017-09-06 NOTE — DISCHARGE NOTE ADULT - CARE PROVIDERS DIRECT ADDRESSES
,DirectAddress_Unknown,DirectAddress_Unknown ,DirectAddress_Unknown,DirectAddress_Unknown,[email or web address]

## 2017-09-06 NOTE — CONSULT NOTE ADULT - ATTENDING COMMENTS
Pt. 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 Now w/ worsening BP over past few weeks, recently started on Aldactone by PCP. Pt. bumped up dose over past few days from 100mg to 300mg PO daily, now p/w N/V and found to be hyponatremic.  -Decrease Aldactone back to home dose of 50mg PO BID, increase amlodipine to 10mg Po daily. Monitor BMP.  -Will evaluate for other hormonal hypersecretion- plasma catecholamines/ metanephrine ordered by PMD, results pending as of yesterday. Will get 1mg dex suppression test  -Will follow w/ you.

## 2017-09-06 NOTE — CONSULT NOTE ADULT - ASSESSMENT
51 yo M w/ hx Conn syndrome s/p L adrenalectomy 3 years ago p/w N/V and foggy, fatigued feeling for several days with severe htn and placed on aldactone. now with uncontrolled htn and MARIUM   1- MARIUM  2- CONN syndrome  3- hypertensive urgency     suspect MARIUM due iv contrast leading to contrast induced nephropathy vs due to malignant htn.   cont with aldactone 50mg bid and norvasc 10mg daily   hold acei and diuretics for now.   agree with cont endo work up

## 2017-09-06 NOTE — DISCHARGE NOTE ADULT - CARE PLAN
See Care Plan under Instructions Principal Discharge DX:	Weakness  Goal:	Improved  Instructions for follow-up, activity and diet:	Continue to increase activity by walking  Follow up with Dr. Linares next week  Secondary Diagnosis:	Essential hypertension  Goal:	maintain goal reading of 140 systolic  Instructions for follow-up, activity and diet:	Follow up with your medical doctor to establish long term blood pressure treatment goals.  Secondary Diagnosis:	High cholesterol  Goal:	stable  Instructions for follow-up, activity and diet:	Continue Crestor and dietary modifications  Secondary Diagnosis:	Hyponatremia  Goal:	normal level  Instructions for follow-up, activity and diet:	follow with Nephrology as discussed  Secondary Diagnosis:	Conn syndrome  Goal:	stable  Instructions for follow-up, activity and diet:	Continue work up with Dr. Heard Principal Discharge DX:	Weakness  Goal:	Improved  Instructions for follow-up, activity and diet:	Continue to increase activity by walking  Follow up with Dr. Linares next week  Secondary Diagnosis:	Essential hypertension  Goal:	maintain goal reading of 140 systolic  Instructions for follow-up, activity and diet:	Follow up with your medical doctor to establish long term blood pressure treatment goals.  Secondary Diagnosis:	High cholesterol  Goal:	stable  Instructions for follow-up, activity and diet:	Continue Crestor and dietary modifications  Secondary Diagnosis:	Hyponatremia  Goal:	normal level  Instructions for follow-up, activity and diet:	follow with Nephrology as discussed  Secondary Diagnosis:	Conn syndrome  Goal:	stable  Instructions for follow-up, activity and diet:	Continue work up with Dr. Heard and Dr. Santo next week

## 2017-09-06 NOTE — DISCHARGE NOTE ADULT - MEDICATION SUMMARY - MEDICATIONS TO TAKE
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    Crestor 20 mg oral tablet  -- 1 tab(s) by mouth once a day (at bedtime)  -- Indication: For High cholesterol    metoprolol tartrate 50 mg oral tablet  -- 1 tab(s) by mouth 2 times a day  -- 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    amLODIPine 10 mg oral tablet  -- 1 tab(s) by mouth once a day  -- 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    Crestor 20 mg oral tablet  -- 1 tab(s) by mouth once a day (at bedtime)  -- Indication: For High cholesterol    metoprolol tartrate 50 mg oral tablet  -- 1 tab(s) by mouth 2 times a day  -- 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    amlodipine 10 mg oral tablet  -- 1 tab(s) by mouth once a day  -- 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 I will START or STAY ON the medications listed below when I get home from the hospital:    May return to work on monday 9/11/17  -- 1   -- Indication: For Weakness    spironolactone 50 mg oral tablet  -- 2 tab(s) by mouth 3 times a day  -- Indication: For Conn syndrome    ibuprofen 200 mg oral capsule  -- 3 cap(s) by mouth 4 times a day, As Needed  -- Indication: For pain    Crestor 20 mg oral tablet  -- 1 tab(s) by mouth once a day (at bedtime)  -- Indication: For High cholesterol    metoprolol tartrate 50 mg oral tablet  -- 1 tab(s) by mouth 2 times a day  -- 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    amLODIPine 10 mg oral tablet  -- 1 tab(s) by mouth once a day  -- 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 I will START or STAY ON the medications listed below when I get home from the hospital:    May return to work on monday 9/11/17  -- 1   -- Indication: For Weakness    spironolactone 50 mg oral tablet  -- 2 tab(s) by mouth 3 times a day  -- Indication: For Conn syndrome    ibuprofen 200 mg oral capsule  -- 3 cap(s) by mouth 4 times a day, As Needed  -- Indication: For pain    Crestor 20 mg oral tablet  -- 1 tab(s) by mouth once a day (at bedtime)  -- Indication: For High cholesterol    metoprolol tartrate 50 mg oral tablet  -- 1 tab(s) by mouth 2 times a day  -- Indication: For Essential hypertension    amLODIPine 10 mg oral tablet  -- 1 tab(s) by mouth once a day  -- 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

## 2017-09-06 NOTE — DISCHARGE NOTE ADULT - NS AS DC FOLLOWUP STROKE INST
Smoking Cessation/Influenza vaccination ([facility] Pub Date: August 19, 2014) Influenza vaccination ([facility] Pub Date: August 19, 2014)

## 2017-09-06 NOTE — DISCHARGE NOTE ADULT - HOSPITAL COURSE
52 m with   weakness  HTN better controlled- started Amlodipine 10.  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  MARIUM?- follow, improving   cathecolamine / metanephrine 24 h urine( was not done as OTP).  DC home with follow with endocrine svc, nephrology and PMD. QA  Santhosh Linares MD pager [phone] 52 m with   weakness  HTN better controlled- started Amlodipine 10.  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  MARIUM?- follow, improving   cathecolamine / metanephrine 24 h urine( was not done as OTP).  DC home with follow with endocrine svc, nephrology and PMD. QA Medically stable.  Santhosh Linares MD pager [phone]

## 2017-09-06 NOTE — DISCHARGE NOTE ADULT - CARE PROVIDER_API CALL
Santhosh Linares (MD), Internal Medicine  [address] [place] [address]  Phone: [phone]  Fax: [phone]    Mildred Heard (DO), Nephrology  [address] [address]  Phone: [phone]  Fax: [phone] Santhosh Linares (MD), Internal Medicine  [id number] [place] [address]  Phone: [phone]  Fax: [phone]    Mildred Heard (DO), Nephrology  [address] [address]  Phone: [phone]  Fax: [phone]    Kem Santo (DO), Internal Medicine  [address] [place] [address]  Phone: [phone]  Fax: [phone]

## 2017-09-06 NOTE — PROGRESS NOTE ADULT - ASSESSMENT
52 m with   weakness  HTN control- start Amlodipine 10.  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  MARIUM?- follow  Further action as per clinical course   aSnthosh Linares MD pager [phone]

## 2017-09-06 NOTE — DISCHARGE NOTE ADULT - PLAN OF CARE
Improved Continue to increase activity by walking  Follow up with Dr. Linares next week maintain goal reading of 140 systolic Follow up with your medical doctor to establish long term blood pressure treatment goals. stable Continue Crestor and dietary modifications normal level follow with Nephrology as discussed Continue work up with Dr. Heard Continue work up with Dr. Heard and Dr. Santo next week

## 2017-09-06 NOTE — DISCHARGE NOTE ADULT - PATIENT PORTAL LINK FT
“You can access the Follow[employer] Patient Portal, offered by [facility], by registering with the following website: [email or web address]”

## 2017-09-06 NOTE — DISCHARGE NOTE ADULT - MEDICATION SUMMARY - MEDICATIONS TO STOP TAKING
I will STOP taking the medications listed below when I get home from the hospital:    lisinopril 5 mg oral tablet  -- 1 tab(s) by mouth once a day x 7 days  -- Do not take this drug if you are pregnant.  It is very important that you take or use this exactly as directed.  Do not skip doses or discontinue unless directed by your doctor.  Some non-prescription drugs may aggravate your condition.  Read all labels carefully.  If a warning appears, check with your doctor before taking.    spironolactone  --  by mouth    hydroCHLOROthiazide  --  by mouth I will STOP taking the medications listed below when I get home from the hospital:    lisinopril 5 mg oral tablet  -- 1 tab(s) by mouth once a day x 7 days  -- Do not take this drug if you are pregnant.  It is very important that you take or use this exactly as directed.  Do not skip doses or discontinue unless directed by your doctor.  Some non-prescription drugs may aggravate your condition.  Read all labels carefully.  If a warning appears, check with your doctor before taking.    spironolactone  --  by mouth    hydrochlorothiazide  --  by mouth

## 2017-09-06 NOTE — DISCHARGE NOTE ADULT - PROVIDER TOKENS
TOKEN:'[id number]:'[id number]' TOKEN:'[id number]:'[id number]',TOKEN:'[id number]'

## 2017-09-06 NOTE — DISCHARGE NOTE ADULT - ADDITIONAL INSTRUCTIONS
Follow with Dr. Santo from Endocrine  Dr. Heard form Nephrology and Dr. Monroe from internal medicine next week

## 2017-09-06 NOTE — PROGRESS NOTE ADULT - SUBJECTIVE AND OBJECTIVE BOX
Patient is a 52y old  Male who presents with a chief complaint of weakness (06 Sep 2017 09:49)      SUBJECTIVE / OVERNIGHT EVENTS: Comfortable without new complaints. Family at bedside.   Review of Systems  chest pain no  palpitations no  sob no  nausea no  headache no    MEDICATIONS  (STANDING):  heparin  Injectable 5000 Unit(s) SubCutaneous every 12 hours  spironolactone 50 milliGRAM(s) Oral two times a day  dexamethasone     Tablet 1 milliGRAM(s) Oral once  amLODIPine   Tablet 10 milliGRAM(s) Oral daily  atorvastatin 80 milliGRAM(s) Oral at bedtime    MEDICATIONS  (PRN):  acetaminophen   Tablet. 650 milliGRAM(s) Oral every 6 hours PRN Moderate Pain (4 - 6)          PHYSICAL EXAM:  GENERAL: NAD, well-developed  HEAD:  Atraumatic, Normocephalic  EYES: EOMI, PERRLA, conjunctiva and sclera clear  NECK: Supple, No JVD  CHEST/LUNG: Clear to auscultation bilaterally; No wheeze  HEART: Regular rate and rhythm; No murmurs, rubs, or gallops  ABDOMEN: Soft, Nontender, Nondistended; Bowel sounds present  EXTREMITIES:  2+ Peripheral Pulses, No clubbing, cyanosis, or edema  PSYCH: AAOx3  NEUROLOGY: non-focal  SKIN: No rashes or lesions    LABS:                        18.1   13.9  )-----------( 273      ( 06 Sep 2017 11:52 )             50.5     -    132<L>  |  92<L>  |  29<H>  ----------------------------<  106<H>  4.4   |  23  |  1.67<H>    Ca    10.5      06 Sep 2017 11:52  Phos  3.0     -  Mg     1.8         TPro  8.5<H>  /  Alb  4.4  /  TBili  0.6  /  DBili  x   /  AST  22  /  ALT  41  /  AlkPhos  79  -    PT/INR - ( 05 Sep 2017 10:24 )   PT: 12.1 sec;   INR: 1.12 ratio         PTT - ( 05 Sep 2017 10:24 )  PTT:22.0 sec  CARDIAC MARKERS ( 05 Sep 2017 10:24 )  x     / <0.01 ng/mL / 85 U/L / x     / 1.8 ng/mL      Urinalysis Basic - ( 05 Sep 2017 13:38 )    Color: Yellow / Appearance: Clear / S.025 / pH: x  Gluc: x / Ketone: Negative  / Bili: Negative / Urobili: Negative   Blood: x / Protein: 30 mg/dL / Nitrite: Negative   Leuk Esterase: Negative / RBC: 3-5 /HPF / WBC 6-10 /HPF   Sq Epi: x / Non Sq Epi: OCC /HPF / Bacteria: x        RADIOLOGY & ADDITIONAL TESTS:    Imaging Personally Reviewed:    Consultant(s) Notes Reviewed:      Care Discussed with Consultants/Other Providers:

## 2017-09-06 NOTE — CONSULT NOTE ADULT - PROBLEM SELECTOR RECOMMENDATION 9
- Likely 2/2 hyperaldosteronism   - - Likely 2/2 benign adrenal adenoma  - No surgical intervention at this time as pt only with one adrenal gland, medical management - Likely 2/2 R benign adrenal adenoma which increased in size from 5/14  - No surgical intervention at this time as pt only with one adrenal gland, medical management recommended  - Hold ACE given pt w/ new MARIUM - Likely 2/2 R benign adrenal adenoma which increased in size from 5/14  - No surgical intervention at this time as pt only with one adrenal gland, medical management recommended  - Hold ACE given pt w/ new MARIUM  - F/u results of outpt catecholamine and metanephrines  - Obtain 1mg dexamethasone suppression test - Likely 2/2 R benign adrenal adenoma which increased in size from 5/14  - Hold ACE given pt w/ new MARIUM  - F/u results of outpt catecholamine and metanephrines  - Obtain 1mg dexamethasone suppression test

## 2017-09-06 NOTE — CONSULT NOTE ADULT - PROBLEM SELECTOR RECOMMENDATION 2
- Decrease spironolactone to 50mg BID as pt was taking 150mg BID at home  - Cont w/ amlodipine 5mg daily  - Hold ACE given pt w/ new MARIUM - Decrease spironolactone to 50mg BID as pt was taking 150mg BID at home  - Cont w/ amlodipine 5mg daily  - Consider adding amiloride as pt is not tolerating higher doses of spironolactone  - Hold ACE given pt w/ new MARIUM - Decrease spironolactone to 50mg BID as pt was taking 150mg BID at home  - Cont w/ amlodipine 5mg daily  - Consider adding amiloride or triamterene low dose as pt is not tolerating higher doses of spironolactone  - Daily BMP to monitor - Decrease spironolactone to 50mg BID as pt was taking 150mg BID at home  - Cont w/ amlodipine 5mg daily, can increase amlodipine to 10mg daily   - Consider switching aldosterone to amiloride or triamterene low dose as pt's BP not improving on aldosterone  - Daily BMP to monitor - Decrease spironolactone to 50mg BID as pt was taking 150mg BID at home  - Cont w/ amlodipine 5mg daily, can increase amlodipine to 10mg daily   - Consider switching aldosterone to amiloride or triamterene low dose if pt's BP not improving on aldosterone, and can't tolerate higher doses.  - Daily BMP to monitor

## 2017-09-06 NOTE — CONSULT NOTE ADULT - ASSESSMENT
Pt is a 51 yo M w/ hx Conn syndrome s/p L adrenalectomy 3 years ago p/w N/V, fatigue feeling for several days, found to be hypertensive, hyponatremic in the setting of increasing spironolactone.

## 2017-09-06 NOTE — CONSULT NOTE ADULT - SUBJECTIVE AND OBJECTIVE BOX
Reason For Consult: Conn Syndrome    HPI:  Pt is a 51 yo M w/ hx Conn syndrome s/p L adrenalectomy 3 years ago p/w N/V and foggy, fatigued feeling for several days. Pt explains that one month ago he was in [place] when he started to develop more severe headaches. He checked his BP which was elevated to 200/110. He followed up with his PCP where his BP was 180/110. He was started on several BP medications with no improvement. He continued to have headaches with HTN. Pt went to his endocrinologist (Dr. Colunga) who started him on spironolactone, believed that the R adrenal gland overworking to compensate for L adrenalectomy as per pt. Pt was initially on 50mg BID but over the last week increased his dose to 150 BID. Pt's BP was not improving, he began to feel nauseous and more lethargic the day of admission. Pt explains that he had similar symptoms 3 years ago at which point he was worked up and found to have a L adrenal mass which was removed. Pt explains that he had interval improvement. Pt denies blurry vision, CP, SOB, + L abd pain, diarrhea, dysuria, fevers/chills, LE pain or edema. Pt's labs from 2 weeks ago show Na and K wnl.     PAST MEDICAL & SURGICAL HISTORY:  Migraines  High cholesterol  Conn syndrome  Essential hypertension  History of appendectomy  History of partial adrenalectomy    FAMILY HISTORY:  No pertinent family history in first degree relatives    Social History: No smoking, drinkings, drugs. Pt works as a .    Outpatient Medications: lisinopril 5 daily, HCTZ 12.5 daily, spironolactone 50 BID    MEDICATIONS  (STANDING):  heparin  Injectable 5000 Unit(s) SubCutaneous every 12 hours  spironolactone 50 milliGRAM(s) Oral two times a day  amLODIPine   Tablet 5 milliGRAM(s) Oral daily    Allergies    No Known Allergies    Review of Systems:  Constitutional: No fever  Eyes: No new blurry vision  Neuro: No tremors  HEENT: No pain  Cardiovascular: No chest pain, palpitations  Respiratory: No SOB, no cough  GI: + NAUSEA, L ABD PAIN  : No dysuria  Skin: no rash  Psych: no depression  Endocrine: no polyuria, polydipsia  Hem/lymph: no swelling  Osteoporosis: no fractures    ALL OTHER SYSTEMS REVIEWED AND NEGATIVE    PHYSICAL EXAM:  VITALS: T(C): 37.1 (09-06-17 @ 09:03)  T(F): 98.8 (09-06-17 @ 09:03), Max: 98.8 (09-06-17 @ 09:03)  HR: 98 (09-06-17 @ 09:55) (74 - 99)  BP: 162/98 (09-06-17 @ 09:55) (141/102 - 180/79)  RR:  (17 - 18)  SpO2:  (94% - 98%)  Wt(kg): --    GENERAL: NAD, well-groomed, well-developed  EYES: No proptosis, no lid lag, anicteric  HEENT:  Atraumatic, Normocephalic, moist mucous membranes  THYROID: Normal size, no palpable nodules  RESPIRATORY: Clear to auscultation bilaterally; No rales, rhonchi, wheezing, or rubs  CARDIOVASCULAR: +TACHYCARDIA w/ regular rhythm; No murmurs; no peripheral edema  GI: Soft, +LUQ TENDERNESS, non distended, normal bowel sounds  SKIN: Dry, intact, No rashes or lesions  MUSCULOSKELETAL: Full range of motion, normal strength  NEURO: sensation intact, extraocular movements intact, no tremor, normal reflexes  PSYCH: Alert and oriented x 3, normal affect, normal mood  CUSHING'S SIGNS: no striae    CAPILLARY BLOOD GLUCOSE  109 (09-05 @ 10:24)                          17.7   15.5  )-----------( 246      ( 05 Sep 2017 10:24 )             49.8       09-05    128<L>  |  90<L>  |  29<H>  ----------------------------<  118<H>  5.3   |  23  |  1.62<H>    EGFR if : 57<L>  EGFR if non : 49<L>    Ca    10.5      09-05  Mg     1.8     09-05  Phos  3.0     09-05    TPro  8.8<H>  /  Alb  4.5  /  TBili  0.6  /  DBili  x   /  AST  32  /  ALT  53<H>  /  AlkPhos  80  09-05    Cortisol AM, Serum (09.06.17 @ 07:49)    Cortisol AM, Serum: 18.5: Note reference range change for CORTAM and CORTPM. ug/dL    Radiology:    Xray Chest 1 View AP- PORTABLE-Urgent (09.05.17 @ 15:30)  EXAM:  CHEST-PORTABLE URGENT                            PROCEDURE DATE:  09/05/2017            INTERPRETATION:  CLINICAL INFORMATION: Weakness and fatigue.    EXAM: Single frontal view chest radiograph.    COMPARISON:  None    FINDINGS:   LINES/TUBES AND POSTSURGICAL CHANGES:  There are scattered surgical clips in the left upper quadrant.    HEART AND MEDIASTINUM:  The cardiac silhouette cannot be accurately evaluated on this projection.    LUNGS/PLEURA:  Clear lungs.  No pneumothorax.   There is no pleural effusion.     OSSEOUS STRUCTURES:  No acute osseous pathology.      IMPRESSION:   Clear lungs.        CT Head No Cont (09.05.17 @ 12:29)   EXAM:  CT BRAIN                            PROCEDURE DATE:  09/05/2017        INTERPRETATION:  Noncontrast CT of the brain.    CLINICAL INDICATION:  Headaches nausea syncope    TECHNIQUE : Axial CT scanning of the brain was obtained from the skull   base to the vertex without the administration of intravenous contrast.      COMPARISON: None available    FINDINGS:  No acute hemorrhage or infarct is identified. No   hydrocephalus, midline shift or extra-axial collections identified. No   calvarial fracture is noted. Georgette graph a right sphenoid sinus polyp   versus retention cyst is noted. The paranasal sinuses and tympanomastoid   cavities are otherwise free of acute disease.    Impression:    No acute hemorrhage or infarct. Reason For Consult: Conn Syndrome    HPI:  Pt is a 53 yo M w/ hx Conn syndrome s/p L adrenalectomy 3 years ago p/w N/V and foggy, fatigued feeling for several days. Pt explains that one month ago he was in [place] when he started to develop more severe headaches. He checked his BP which was elevated to 200/110. He followed up with his PCP where his BP was 180/110. He was started on several BP medications with no improvement. He continued to have headaches with HTN. Pt went to his endocrinologist (Dr. Colunga) who started him on spironolactone, believed that the R adrenal gland overworking to compensate for L adrenalectomy as per pt. Pt was initially on 50mg BID but over the last week increased his dose to 150 BID. Pt's BP was not improving, he began to feel nauseous and more lethargic the day of admission. Pt explains that he had similar symptoms 3 years ago at which point he was worked up and found to have a L adrenal mass which was removed. Pt explains that he had interval improvement. Pt denies blurry vision, CP, SOB, + L abd pain, diarrhea, dysuria, fevers/chills, LE pain or edema. Pt's labs from 2 weeks ago show Na and K wnl. Pt had outpt CT scan done during the last week, which showed a R adrenal lesion with washout features diagnostic for benign adenoma, currently measuring 2.5cm, enlarged since 5/14 when it was 1cm.     PAST MEDICAL & SURGICAL HISTORY:  Migraines  High cholesterol  Conn syndrome  Essential hypertension  History of appendectomy  History of partial adrenalectomy    FAMILY HISTORY:  No pertinent family history in first degree relatives    Social History: No smoking, drinkings, drugs. Pt works as a .    Outpatient Medications: lisinopril 5 daily, HCTZ 12.5 daily, spironolactone 50 BID    MEDICATIONS  (STANDING):  heparin  Injectable 5000 Unit(s) SubCutaneous every 12 hours  spironolactone 50 milliGRAM(s) Oral two times a day  amLODIPine   Tablet 5 milliGRAM(s) Oral daily    Allergies    No Known Allergies    Review of Systems:  Constitutional: No fever  Eyes: No new blurry vision  Neuro: No tremors  HEENT: No pain  Cardiovascular: No chest pain, palpitations  Respiratory: No SOB, no cough  GI: + NAUSEA, L ABD PAIN  : No dysuria  Skin: no rash  Psych: no depression  Endocrine: no polyuria, polydipsia  Hem/lymph: no swelling  Osteoporosis: no fractures    ALL OTHER SYSTEMS REVIEWED AND NEGATIVE    PHYSICAL EXAM:  VITALS: T(C): 37.1 (09-06-17 @ 09:03)  T(F): 98.8 (09-06-17 @ 09:03), Max: 98.8 (09-06-17 @ 09:03)  HR: 98 (09-06-17 @ 09:55) (74 - 99)  BP: 162/98 (09-06-17 @ 09:55) (141/102 - 180/79)  RR:  (17 - 18)  SpO2:  (94% - 98%)  Wt(kg): --    GENERAL: NAD, well-groomed, well-developed  EYES: No proptosis, no lid lag, anicteric  HEENT:  Atraumatic, Normocephalic, moist mucous membranes  THYROID: Normal size, no palpable nodules  RESPIRATORY: Clear to auscultation bilaterally; No rales, rhonchi, wheezing, or rubs  CARDIOVASCULAR: +TACHYCARDIA w/ regular rhythm; No murmurs; no peripheral edema  GI: Soft, +LUQ TENDERNESS, non distended, normal bowel sounds  SKIN: Dry, intact, No rashes or lesions  MUSCULOSKELETAL: Full range of motion, normal strength  NEURO: sensation intact, extraocular movements intact, no tremor, normal reflexes  PSYCH: Alert and oriented x 3, normal affect, normal mood  CUSHING'S SIGNS: no striae    CAPILLARY BLOOD GLUCOSE  109 (09-05 @ 10:24)                          17.7   15.5  )-----------( 246      ( 05 Sep 2017 10:24 )             49.8       09-05    128<L>  |  90<L>  |  29<H>  ----------------------------<  118<H>  5.3   |  23  |  1.62<H>    EGFR if : 57<L>  EGFR if non : 49<L>    Ca    10.5      09-05  Mg     1.8     09-05  Phos  3.0     09-05    TPro  8.8<H>  /  Alb  4.5  /  TBili  0.6  /  DBili  x   /  AST  32  /  ALT  53<H>  /  AlkPhos  80  09-05    Cortisol AM, Serum (09.06.17 @ 07:49)    Cortisol AM, Serum: 18.5: Note reference range change for CORTAM and CORTPM. ug/dL    Radiology:    Xray Chest 1 View AP- PORTABLE-Urgent (09.05.17 @ 15:30)  EXAM:  CHEST-PORTABLE URGENT                            PROCEDURE DATE:  09/05/2017            INTERPRETATION:  CLINICAL INFORMATION: Weakness and fatigue.    EXAM: Single frontal view chest radiograph.    COMPARISON:  None    FINDINGS:   LINES/TUBES AND POSTSURGICAL CHANGES:  There are scattered surgical clips in the left upper quadrant.    HEART AND MEDIASTINUM:  The cardiac silhouette cannot be accurately evaluated on this projection.    LUNGS/PLEURA:  Clear lungs.  No pneumothorax.   There is no pleural effusion.     OSSEOUS STRUCTURES:  No acute osseous pathology.      IMPRESSION:   Clear lungs.        CT Head No Cont (09.05.17 @ 12:29)   EXAM:  CT BRAIN                            PROCEDURE DATE:  09/05/2017        INTERPRETATION:  Noncontrast CT of the brain.    CLINICAL INDICATION:  Headaches nausea syncope    TECHNIQUE : Axial CT scanning of the brain was obtained from the skull   base to the vertex without the administration of intravenous contrast.      COMPARISON: None available    FINDINGS:  No acute hemorrhage or infarct is identified. No   hydrocephalus, midline shift or extra-axial collections identified. No   calvarial fracture is noted. Georgette graph a right sphenoid sinus polyp   versus retention cyst is noted. The paranasal sinuses and tympanomastoid   cavities are otherwise free of acute disease.    Impression:    No acute hemorrhage or infarct. Reason For Consult: Conn Syndrome    HPI:  Pt is a 53 yo M w/ hx Conn syndrome s/p L adrenalectomy 3 years ago p/w N/V and foggy, fatigued feeling for several days. Pt explains that one month ago he was in [place] when he started to develop more severe headaches. He checked his BP which was elevated to 200/110. He followed up with his PCP where his BP was 180/110. He was started on several BP medications with no improvement. He continued to have headaches with HTN. Pt went to his endocrinologist (Dr. Colunga) who started him on spironolactone. Pt was initially on 50mg BID but over the last week increased his dose to 150 BID. Pt's BP was not improving, he began to feel nauseous and more lethargic the day of admission. Pt explains that he had similar symptoms 3 years ago at which point he was worked up and found to have a L adrenal mass which was removed. Pt explains that he had interval improvement. Pt denies blurry vision, CP, SOB, + L abd pain, diarrhea, dysuria, fevers/chills, LE pain or edema. Pt's labs from 2 weeks ago show Na and K wnl. Pt had outpt CT scan done during the last week, which showed a R adrenal lesion with washout features diagnostic for benign adenoma, currently measuring 2.5cm, enlarged since 5/14 when it was 1cm.     PAST MEDICAL & SURGICAL HISTORY:  Migraines  High cholesterol  Conn syndrome  Essential hypertension  History of appendectomy  History of partial adrenalectomy    FAMILY HISTORY:  No pertinent family history in first degree relatives    Social History: No smoking, drinkings, drugs. Pt works as a .    Outpatient Medications: lisinopril 5 daily, HCTZ 12.5 daily, spironolactone 50 BID    MEDICATIONS  (STANDING):  heparin  Injectable 5000 Unit(s) SubCutaneous every 12 hours  spironolactone 50 milliGRAM(s) Oral two times a day  amLODIPine   Tablet 5 milliGRAM(s) Oral daily    Allergies    No Known Allergies    Review of Systems:  Constitutional: No fever  Eyes: No new blurry vision  Neuro: No tremors  HEENT: No pain  Cardiovascular: No chest pain, palpitations  Respiratory: No SOB, no cough  GI: + NAUSEA, L ABD PAIN  : No dysuria  Skin: no rash  Psych: no depression  Endocrine: no polyuria, polydipsia  Hem/lymph: no swelling  Osteoporosis: no fractures    ALL OTHER SYSTEMS REVIEWED AND NEGATIVE    PHYSICAL EXAM:  VITALS: T(C): 37.1 (09-06-17 @ 09:03)  T(F): 98.8 (09-06-17 @ 09:03), Max: 98.8 (09-06-17 @ 09:03)  HR: 98 (09-06-17 @ 09:55) (74 - 99)  BP: 162/98 (09-06-17 @ 09:55) (141/102 - 180/79)  RR:  (17 - 18)  SpO2:  (94% - 98%)  Wt(kg): --    GENERAL: NAD, well-groomed, well-developed  EYES: No proptosis, no lid lag, anicteric  HEENT:  Atraumatic, Normocephalic, moist mucous membranes  THYROID: Normal size, no palpable nodules  RESPIRATORY: Clear to auscultation bilaterally; No rales, rhonchi, wheezing, or rubs  CARDIOVASCULAR: +TACHYCARDIA w/ regular rhythm; No murmurs; no peripheral edema  GI: Soft, +LUQ TENDERNESS, non distended, normal bowel sounds  SKIN: Dry, intact, No rashes or lesions  MUSCULOSKELETAL: Full range of motion, normal strength  NEURO: sensation intact, extraocular movements intact, no tremor, normal reflexes  PSYCH: Alert and oriented x 3, normal affect, normal mood  CUSHING'S SIGNS: no striae    CAPILLARY BLOOD GLUCOSE  109 (09-05 @ 10:24)                          17.7   15.5  )-----------( 246      ( 05 Sep 2017 10:24 )             49.8       09-05    128<L>  |  90<L>  |  29<H>  ----------------------------<  118<H>  5.3   |  23  |  1.62<H>    EGFR if : 57<L>  EGFR if non : 49<L>    Ca    10.5      09-05  Mg     1.8     09-05  Phos  3.0     09-05    TPro  8.8<H>  /  Alb  4.5  /  TBili  0.6  /  DBili  x   /  AST  32  /  ALT  53<H>  /  AlkPhos  80  09-05    Cortisol AM, Serum (09.06.17 @ 07:49)    Cortisol AM, Serum: 18.5: Note reference range change for CORTAM and CORTPM. ug/dL    Radiology:    Xray Chest 1 View AP- PORTABLE-Urgent (09.05.17 @ 15:30)  EXAM:  CHEST-PORTABLE URGENT                            PROCEDURE DATE:  09/05/2017            INTERPRETATION:  CLINICAL INFORMATION: Weakness and fatigue.    EXAM: Single frontal view chest radiograph.    COMPARISON:  None    FINDINGS:   LINES/TUBES AND POSTSURGICAL CHANGES:  There are scattered surgical clips in the left upper quadrant.    HEART AND MEDIASTINUM:  The cardiac silhouette cannot be accurately evaluated on this projection.    LUNGS/PLEURA:  Clear lungs.  No pneumothorax.   There is no pleural effusion.     OSSEOUS STRUCTURES:  No acute osseous pathology.      IMPRESSION:   Clear lungs.        CT Head No Cont (09.05.17 @ 12:29)   EXAM:  CT BRAIN                            PROCEDURE DATE:  09/05/2017        INTERPRETATION:  Noncontrast CT of the brain.    CLINICAL INDICATION:  Headaches nausea syncope    TECHNIQUE : Axial CT scanning of the brain was obtained from the skull   base to the vertex without the administration of intravenous contrast.      COMPARISON: None available    FINDINGS:  No acute hemorrhage or infarct is identified. No   hydrocephalus, midline shift or extra-axial collections identified. No   calvarial fracture is noted. Georgette graph a right sphenoid sinus polyp   versus retention cyst is noted. The paranasal sinuses and tympanomastoid   cavities are otherwise free of acute disease.    Impression:    No acute hemorrhage or infarct.

## 2017-09-07 LAB
ANION GAP SERPL CALC-SCNC: 13 MMOL/L — SIGNIFICANT CHANGE UP (ref 5–17)
ANION GAP SERPL CALC-SCNC: 15 MMOL/L — SIGNIFICANT CHANGE UP (ref 5–17)
BASOPHILS # BLD AUTO: 0.03 K/UL — SIGNIFICANT CHANGE UP (ref 0–0.2)
BASOPHILS NFR BLD AUTO: 0.2 % — SIGNIFICANT CHANGE UP (ref 0–2)
BUN SERPL-MCNC: 30 MG/DL — HIGH (ref 7–23)
BUN SERPL-MCNC: 33 MG/DL — HIGH (ref 7–23)
CALCIUM SERPL-MCNC: 10.2 MG/DL — SIGNIFICANT CHANGE UP (ref 8.4–10.5)
CALCIUM SERPL-MCNC: 9.8 MG/DL — SIGNIFICANT CHANGE UP (ref 8.4–10.5)
CHLORIDE SERPL-SCNC: 91 MMOL/L — LOW (ref 96–108)
CHLORIDE SERPL-SCNC: 93 MMOL/L — LOW (ref 96–108)
CO2 SERPL-SCNC: 24 MMOL/L — SIGNIFICANT CHANGE UP (ref 22–31)
CO2 SERPL-SCNC: 26 MMOL/L — SIGNIFICANT CHANGE UP (ref 22–31)
CORTIS AM PEAK SERPL-MCNC: 2.2 UG/DL — LOW (ref 6–18.4)
CREAT SERPL-MCNC: 1.42 MG/DL — HIGH (ref 0.5–1.3)
CREAT SERPL-MCNC: 1.45 MG/DL — HIGH (ref 0.5–1.3)
EOSINOPHIL # BLD AUTO: 0.11 K/UL — SIGNIFICANT CHANGE UP (ref 0–0.5)
EOSINOPHIL NFR BLD AUTO: 0.9 % — SIGNIFICANT CHANGE UP (ref 0–6)
GLUCOSE SERPL-MCNC: 130 MG/DL — HIGH (ref 70–99)
GLUCOSE SERPL-MCNC: 136 MG/DL — HIGH (ref 70–99)
HCT VFR BLD CALC: 46 % — SIGNIFICANT CHANGE UP (ref 39–50)
HGB BLD-MCNC: 16.8 G/DL — SIGNIFICANT CHANGE UP (ref 13–17)
IMM GRANULOCYTES NFR BLD AUTO: 0.4 % — SIGNIFICANT CHANGE UP (ref 0–1.5)
LYMPHOCYTES # BLD AUTO: 1.79 K/UL — SIGNIFICANT CHANGE UP (ref 1–3.3)
LYMPHOCYTES # BLD AUTO: 14.8 % — SIGNIFICANT CHANGE UP (ref 13–44)
MCHC RBC-ENTMCNC: 31.7 PG — SIGNIFICANT CHANGE UP (ref 27–34)
MCHC RBC-ENTMCNC: 36.5 GM/DL — HIGH (ref 32–36)
MCV RBC AUTO: 86.8 FL — SIGNIFICANT CHANGE UP (ref 80–100)
MONOCYTES # BLD AUTO: 1 K/UL — HIGH (ref 0–0.9)
MONOCYTES NFR BLD AUTO: 8.3 % — SIGNIFICANT CHANGE UP (ref 2–14)
NEUTROPHILS # BLD AUTO: 9.11 K/UL — HIGH (ref 1.8–7.4)
NEUTROPHILS NFR BLD AUTO: 75.4 % — SIGNIFICANT CHANGE UP (ref 43–77)
PLATELET # BLD AUTO: 276 K/UL — SIGNIFICANT CHANGE UP (ref 150–400)
POTASSIUM SERPL-MCNC: 4.6 MMOL/L — SIGNIFICANT CHANGE UP (ref 3.5–5.3)
POTASSIUM SERPL-MCNC: 5.2 MMOL/L — SIGNIFICANT CHANGE UP (ref 3.5–5.3)
POTASSIUM SERPL-SCNC: 4.6 MMOL/L — SIGNIFICANT CHANGE UP (ref 3.5–5.3)
POTASSIUM SERPL-SCNC: 5.2 MMOL/L — SIGNIFICANT CHANGE UP (ref 3.5–5.3)
RBC # BLD: 5.3 M/UL — SIGNIFICANT CHANGE UP (ref 4.2–5.8)
RBC # FLD: 11.5 % — SIGNIFICANT CHANGE UP (ref 10.3–14.5)
SODIUM SERPL-SCNC: 130 MMOL/L — LOW (ref 135–145)
SODIUM SERPL-SCNC: 132 MMOL/L — LOW (ref 135–145)
WBC # BLD: 12.09 K/UL — HIGH (ref 3.8–10.5)
WBC # FLD AUTO: 12.09 K/UL — HIGH (ref 3.8–10.5)

## 2017-09-07 PROCEDURE — 99233 SBSQ HOSP IP/OBS HIGH 50: CPT | Mod: GC

## 2017-09-07 RX ORDER — METOPROLOL TARTRATE 50 MG
25 TABLET ORAL
Qty: 0 | Refills: 0 | Status: DISCONTINUED | OUTPATIENT
Start: 2017-09-07 | End: 2017-09-08

## 2017-09-07 RX ADMIN — HEPARIN SODIUM 5000 UNIT(S): 5000 INJECTION INTRAVENOUS; SUBCUTANEOUS at 17:40

## 2017-09-07 RX ADMIN — ATORVASTATIN CALCIUM 80 MILLIGRAM(S): 80 TABLET, FILM COATED ORAL at 21:58

## 2017-09-07 RX ADMIN — Medication 650 MILLIGRAM(S): at 06:37

## 2017-09-07 RX ADMIN — Medication 25 MILLIGRAM(S): at 13:46

## 2017-09-07 RX ADMIN — SPIRONOLACTONE 50 MILLIGRAM(S): 25 TABLET, FILM COATED ORAL at 17:40

## 2017-09-07 RX ADMIN — SPIRONOLACTONE 50 MILLIGRAM(S): 25 TABLET, FILM COATED ORAL at 06:31

## 2017-09-07 RX ADMIN — AMLODIPINE BESYLATE 10 MILLIGRAM(S): 2.5 TABLET ORAL at 06:31

## 2017-09-07 RX ADMIN — HEPARIN SODIUM 5000 UNIT(S): 5000 INJECTION INTRAVENOUS; SUBCUTANEOUS at 06:31

## 2017-09-07 RX ADMIN — Medication 25 MILLIGRAM(S): at 21:59

## 2017-09-07 RX ADMIN — Medication 650 MILLIGRAM(S): at 07:32

## 2017-09-07 NOTE — PROGRESS NOTE ADULT - SUBJECTIVE AND OBJECTIVE BOX
INTERVAL HPI/OVERNIGHT EVENTS: No overnight events. Pt explains that he continues to have a headache, explains that there may be some anxiety component to it. Denies CP, SOB, N/V, abd pain, diarrhea, fever/chills, LE pain. Tolerating PO.     MEDICATIONS  (STANDING):  heparin  Injectable 5000 Unit(s) SubCutaneous every 12 hours  spironolactone 50 milliGRAM(s) Oral two times a day  amLODIPine   Tablet 10 milliGRAM(s) Oral daily  atorvastatin 80 milliGRAM(s) Oral at bedtime    MEDICATIONS  (PRN):  acetaminophen   Tablet. 650 milliGRAM(s) Oral every 6 hours PRN Moderate Pain (4 - 6)    Allergies    No Known Allergies    REVIEW OF SYSTEMS    Constitutional: No fever  Eyes: No new blurry vision  Neuro: No tremors  HEENT: +HEADACHES  Cardiovascular: No chest pain, palpitations  Respiratory: No SOB, no cough  GI: No ab pain  : No dysuria  Skin: no rash  Psych: +ANXIETY  Endocrine: no polyuria, polydipsia  Hem/lymph: no swelling  Osteoporosis: no fractures    ALL OTHER SYSTEMS REVIEWED AND NEGATIVE    Vital Signs Last 24 Hrs  T(C): 36.6 (07 Sep 2017 06:55), Max: 36.8 (06 Sep 2017 21:17)  T(F): 97.9 (07 Sep 2017 06:55), Max: 98.2 (06 Sep 2017 21:17)  HR: 83 (07 Sep 2017 06:55) (77 - 98)  BP: 159/96 (07 Sep 2017 06:55) (144/97 - 162/98)  BP(mean): --  RR: 18 (07 Sep 2017 06:55) (17 - 18)  SpO2: 96% (07 Sep 2017 06:55) (95% - 97%)    PHYSICAL EXAM:    GENERAL: NAD, well-groomed, well-developed  EYES: No proptosis, no lid lag, anicteric  HEENT:  Atraumatic, Normocephalic, moist mucous membranes  THYROID: Normal size, no palpable nodules  RESPIRATORY: Clear to auscultation bilaterally; No rales, rhonchi, wheezing, or rubs  CARDIOVASCULAR: +TACHYCARDIA w/ regular rhythm; No murmurs; no peripheral edema  GI: Soft, +LUQ TENDERNESS, non distended, normal bowel sounds  SKIN: Dry, intact, No rashes or lesions  MUSCULOSKELETAL: Full range of motion, normal strength  NEURO: sensation intact, extraocular movements intact, no tremor, normal reflexes  PSYCH: Alert and oriented x 3, normal affect, normal mood  CUSHING'S SIGNS: no striae    LABS:                        16.8   12.09 )-----------( 276      ( 07 Sep 2017 09:03 )             46.0     -    130<L>  |  91<L>  |  30<H>  ----------------------------<  130<H>  5.2   |  24  |  1.42<H>    Ca    9.8      07 Sep 2017 08:58  Phos  3.0       Mg     1.8         TPro  8.5<H>  /  Alb  4.4  /  TBili  0.6  /  DBili  x   /  AST  22  /  ALT  41  /  AlkPhos  79  -06    CAPILLARY BLOOD GLUCOSE    PT/INR - ( 05 Sep 2017 10:24 )   PT: 12.1 sec;   INR: 1.12 ratio      PTT - ( 05 Sep 2017 10:24 )  PTT:22.0 sec  Urinalysis Basic - ( 05 Sep 2017 13:38 )    Color: Yellow / Appearance: Clear / S.025 / pH: x  Gluc: x / Ketone: Negative  / Bili: Negative / Urobili: Negative   Blood: x / Protein: 30 mg/dL / Nitrite: Negative   Leuk Esterase: Negative / RBC: 3-5 /HPF / WBC 6-10 /HPF   Sq Epi: x / Non Sq Epi: OCC /HPF / Bacteria: x    RADIOLOGY & ADDITIONAL TESTS: No interval imaging. INTERVAL HPI/OVERNIGHT EVENTS: No overnight events. Pt explains that he continues to have a headache, explains that there may be some anxiety component to it. Denies CP, SOB, N/V, abd pain, diarrhea, fever/chills, LE pain. Tolerating PO.     MEDICATIONS  (STANDING):  heparin  Injectable 5000 Unit(s) SubCutaneous every 12 hours  spironolactone 50 milliGRAM(s) Oral two times a day  amLODIPine   Tablet 10 milliGRAM(s) Oral daily  atorvastatin 80 milliGRAM(s) Oral at bedtime    MEDICATIONS  (PRN):  acetaminophen   Tablet. 650 milliGRAM(s) Oral every 6 hours PRN Moderate Pain (4 - 6)    Allergies    No Known Allergies    REVIEW OF SYSTEMS    Constitutional: No fever  Eyes: No new blurry vision  Neuro: No tremors  HEENT: +HEADACHES  Cardiovascular: No chest pain, palpitations  Respiratory: No SOB, no cough  GI: No ab pain  : No dysuria  Skin: no rash  Psych: +ANXIETY  Endocrine: no polyuria, polydipsia  Hem/lymph: no swelling  Osteoporosis: no fractures    ALL OTHER SYSTEMS REVIEWED AND NEGATIVE    Vital Signs Last 24 Hrs  T(C): 36.6 (07 Sep 2017 06:55), Max: 36.8 (06 Sep 2017 21:17)  T(F): 97.9 (07 Sep 2017 06:55), Max: 98.2 (06 Sep 2017 21:17)  HR: 83 (07 Sep 2017 06:55) (77 - 98)  BP: 159/96 (07 Sep 2017 06:55) (144/97 - 162/98)  BP(mean): --  RR: 18 (07 Sep 2017 06:55) (17 - 18)  SpO2: 96% (07 Sep 2017 06:55) (95% - 97%)    PHYSICAL EXAM:    GENERAL: NAD, well-groomed, well-developed  RESPIRATORY: Clear to auscultation bilaterally; No rales, rhonchi, wheezing, or rubs  CARDIOVASCULAR: +TACHYCARDIA w/ regular rhythm; No murmurs; no peripheral edema  GI: Soft, Abd soft, nontender, non distended, normal bowel sounds  MUSCULOSKELETAL: Full range of motion, normal strength  PSYCH: Alert and oriented x 3, anxious    LABS:                        16.8   12.09 )-----------( 276      ( 07 Sep 2017 09:03 )             46.0     09-07    130<L>  |  91<L>  |  30<H>  ----------------------------<  130<H>  5.2   |  24  |  1.42<H>    Ca    9.8      07 Sep 2017 08:58  Phos  3.0       Mg     1.8         TPro  8.5<H>  /  Alb  4.4  /  TBili  0.6  /  DBili  x   /  AST  22  /  ALT  41  /  AlkPhos  79      CAPILLARY BLOOD GLUCOSE    PT/INR - ( 05 Sep 2017 10:24 )   PT: 12.1 sec;   INR: 1.12 ratio      PTT - ( 05 Sep 2017 10:24 )  PTT:22.0 sec  Urinalysis Basic - ( 05 Sep 2017 13:38 )    Color: Yellow / Appearance: Clear / S.025 / pH: x  Gluc: x / Ketone: Negative  / Bili: Negative / Urobili: Negative   Blood: x / Protein: 30 mg/dL / Nitrite: Negative   Leuk Esterase: Negative / RBC: 3-5 /HPF / WBC 6-10 /HPF   Sq Epi: x / Non Sq Epi: OCC /HPF / Bacteria: x    RADIOLOGY & ADDITIONAL TESTS: No interval imaging. INTERVAL HPI/OVERNIGHT EVENTS: No overnight events. Pt explains that he continues to have a headache, explains that there may be some anxiety component to it. Denies CP, SOB, N/V, abd pain, diarrhea, fever/chills, LE pain. Tolerating PO.     MEDICATIONS  (STANDING):  heparin  Injectable 5000 Unit(s) SubCutaneous every 12 hours  spironolactone 50 milliGRAM(s) Oral two times a day  amLODIPine   Tablet 10 milliGRAM(s) Oral daily  atorvastatin 80 milliGRAM(s) Oral at bedtime    MEDICATIONS  (PRN):  acetaminophen   Tablet. 650 milliGRAM(s) Oral every 6 hours PRN Moderate Pain (4 - 6)    Allergies    No Known Allergies    REVIEW OF SYSTEMS    Constitutional: No fever  HEENT: +HEADACHES  Cardiovascular: No chest pain, palpitations  Respiratory: No SOB, no cough  GI: No ab pain  Psych: +ANXIETY    ALL OTHER SYSTEMS REVIEWED AND NEGATIVE    Vital Signs Last 24 Hrs  T(C): 36.6 (07 Sep 2017 06:55), Max: 36.8 (06 Sep 2017 21:17)  T(F): 97.9 (07 Sep 2017 06:55), Max: 98.2 (06 Sep 2017 21:17)  HR: 83 (07 Sep 2017 06:55) (77 - 98)  BP: 159/96 (07 Sep 2017 06:55) (144/97 - 162/98)  BP(mean): --  RR: 18 (07 Sep 2017 06:55) (17 - 18)  SpO2: 96% (07 Sep 2017 06:55) (95% - 97%)    PHYSICAL EXAM:    GENERAL: NAD, well-groomed, well-developed  RESPIRATORY: Clear to auscultation bilaterally; No rales, rhonchi, wheezing, or rubs  CARDIOVASCULAR: +TACHYCARDIA w/ regular rhythm; No murmurs; no peripheral edema  GI: Soft, Abd soft, nontender, non distended, normal bowel sounds  MUSCULOSKELETAL: Full range of motion, normal strength  PSYCH: Alert and oriented x 3, anxious    LABS:                        16.8   12.09 )-----------( 276      ( 07 Sep 2017 09:03 )             46.0     09-07    130<L>  |  91<L>  |  30<H>  ----------------------------<  130<H>  5.2   |  24  |  1.42<H>    Ca    9.8      07 Sep 2017 08:58  Phos  3.0     -  Mg     1.8     -    TPro  8.5<H>  /  Alb  4.4  /  TBili  0.6  /  DBili  x   /  AST  22  /  ALT  41  /  AlkPhos  79  09-06    CAPILLARY BLOOD GLUCOSE    PT/INR - ( 05 Sep 2017 10:24 )   PT: 12.1 sec;   INR: 1.12 ratio      PTT - ( 05 Sep 2017 10:24 )  PTT:22.0 sec  Urinalysis Basic - ( 05 Sep 2017 13:38 )    Color: Yellow / Appearance: Clear / S.025 / pH: x  Gluc: x / Ketone: Negative  / Bili: Negative / Urobili: Negative   Blood: x / Protein: 30 mg/dL / Nitrite: Negative   Leuk Esterase: Negative / RBC: 3-5 /HPF / WBC 6-10 /HPF   Sq Epi: x / Non Sq Epi: OCC /HPF / Bacteria: x    RADIOLOGY & ADDITIONAL TESTS: No interval imaging.

## 2017-09-07 NOTE — PROGRESS NOTE ADULT - PROBLEM SELECTOR PLAN 2
- Improved to 132 from 128, f/u AM labs  - C/w spironolactone 50mg BID  - Consider switching aldosterone to amiloride or triamterene low dose if pt's BP not improving on aldosterone, and can't tolerate higher doses because of hyponatremia.  - Daily BMP to monitor - Na 128 -> 132 -> 130  - C/w spironolactone 50mg BID  - Consider switching aldosterone to amiloride or triamterene low dose if pt's BP not improving on aldosterone, and can't tolerate higher doses because of hyponatremia.  - Consider starting isotonic fluids for slow correction  - Daily BMP to monitor - Likely 2/2 increased spironolactone dose vs. HCTZ   - Na 128 -> 132 -> 130  - C/w spironolactone 50mg BID  - Consider fluid restriction if does not improve  - Obtain urine Na to further evaluate  - Daily BMP to monitor

## 2017-09-07 NOTE — PROGRESS NOTE ADULT - SUBJECTIVE AND OBJECTIVE BOX
Patient is a 52y old  Male who presents with a chief complaint of weakness (06 Sep 2017 09:49)      SUBJECTIVE / OVERNIGHT EVENTS: Comfortable without new complaints.   Review of Systems  chest pain no  palpitations no  sob no  nausea no  headache no    MEDICATIONS  (STANDING):  heparin  Injectable 5000 Unit(s) SubCutaneous every 12 hours  spironolactone 50 milliGRAM(s) Oral two times a day  amLODIPine   Tablet 10 milliGRAM(s) Oral daily  atorvastatin 80 milliGRAM(s) Oral at bedtime    MEDICATIONS  (PRN):  acetaminophen   Tablet. 650 milliGRAM(s) Oral every 6 hours PRN Moderate Pain (4 - 6)          PHYSICAL EXAM:  GENERAL: NAD, well-developed  HEAD:  Atraumatic, Normocephalic  EYES: EOMI, PERRLA, conjunctiva and sclera clear  NECK: Supple, No JVD  CHEST/LUNG: Clear to auscultation bilaterally; No wheeze  HEART: Regular rate and rhythm; No murmurs, rubs, or gallops  ABDOMEN: Soft, Nontender, Nondistended; Bowel sounds present  EXTREMITIES:  2+ Peripheral Pulses, No clubbing, cyanosis, or edema  PSYCH: AAOx3  NEUROLOGY: non-focal  SKIN: No rashes or lesions    LABS:                        16.8   12.09 )-----------( 276      ( 07 Sep 2017 09:03 )             46.0     -    130<L>  |  91<L>  |  30<H>  ----------------------------<  130<H>  5.2   |  24  |  1.42<H>    Ca    9.8      07 Sep 2017 08:58    TPro  8.5<H>  /  Alb  4.4  /  TBili  0.6  /  DBili  x   /  AST  22  /  ALT  41  /  AlkPhos  79  09-06          Urinalysis Basic - ( 05 Sep 2017 13:38 )    Color: Yellow / Appearance: Clear / S.025 / pH: x  Gluc: x / Ketone: Negative  / Bili: Negative / Urobili: Negative   Blood: x / Protein: 30 mg/dL / Nitrite: Negative   Leuk Esterase: Negative / RBC: 3-5 /HPF / WBC 6-10 /HPF   Sq Epi: x / Non Sq Epi: OCC /HPF / Bacteria: x        RADIOLOGY & ADDITIONAL TESTS:    Imaging Personally Reviewed:    Consultant(s) Notes Reviewed:      Care Discussed with Consultants/Other Providers:

## 2017-09-07 NOTE — PROGRESS NOTE ADULT - PROBLEM SELECTOR PLAN 1
- Likely 2/2 R benign adrenal adenoma which increased in size to 2.5cm from 1cm on 5/14  - Hold ACE given pt w/ new MARIUM  - F/u results of outpt catecholamine and metanephrines  - F/u dexamethasone suppression test results  - C/w norvasc 10mg daily and spironolactone 50mg BID - Likely 2/2 R benign adrenal adenoma which increased in size to 2.5cm from 1cm on 5/14  - Hold ACE given pt w/ new MARIUM  - F/u results of outpt catecholamine and metanephrines  - F/u dexamethasone suppression test results  - C/w norvasc 10mg daily and spironolactone 50mg BID  - Consider adding BB for HTN given pt's HR can tolerate

## 2017-09-07 NOTE — PROGRESS NOTE ADULT - ASSESSMENT
Pt is a 51 yo M w/ hx Conn syndrome s/p L adrenalectomy 3 years ago p/w N/V, fatigue, headaches and persistent hypertension for several days, found to be hyponatremic in the setting of increasing spironolactone dosage.

## 2017-09-07 NOTE — PROGRESS NOTE ADULT - ASSESSMENT
52 m with   weakness  HTN control- started Amlodipine 10.  Add Lopressor 25 bid  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  MARIUM?- follow  Santhosh Linares MD pager [phone]

## 2017-09-08 LAB
ANION GAP SERPL CALC-SCNC: 13 MMOL/L — SIGNIFICANT CHANGE UP (ref 5–17)
BASOPHILS # BLD AUTO: 0.1 K/UL — SIGNIFICANT CHANGE UP (ref 0–0.2)
BASOPHILS NFR BLD AUTO: 0.6 % — SIGNIFICANT CHANGE UP (ref 0–2)
BUN SERPL-MCNC: 26 MG/DL — HIGH (ref 7–23)
CALCIUM SERPL-MCNC: 9.4 MG/DL — SIGNIFICANT CHANGE UP (ref 8.4–10.5)
CHLORIDE SERPL-SCNC: 95 MMOL/L — LOW (ref 96–108)
CO2 SERPL-SCNC: 22 MMOL/L — SIGNIFICANT CHANGE UP (ref 22–31)
CREAT SERPL-MCNC: 1.13 MG/DL — SIGNIFICANT CHANGE UP (ref 0.5–1.3)
EOSINOPHIL # BLD AUTO: 0.1 K/UL — SIGNIFICANT CHANGE UP (ref 0–0.5)
EOSINOPHIL NFR BLD AUTO: 1 % — SIGNIFICANT CHANGE UP (ref 0–6)
GLUCOSE SERPL-MCNC: 133 MG/DL — HIGH (ref 70–99)
HCT VFR BLD CALC: 45.1 % — SIGNIFICANT CHANGE UP (ref 39–50)
HGB BLD-MCNC: 16 G/DL — SIGNIFICANT CHANGE UP (ref 13–17)
LYMPHOCYTES # BLD AUTO: 1.8 K/UL — SIGNIFICANT CHANGE UP (ref 1–3.3)
LYMPHOCYTES # BLD AUTO: 14.4 % — SIGNIFICANT CHANGE UP (ref 13–44)
MCHC RBC-ENTMCNC: 32.8 PG — SIGNIFICANT CHANGE UP (ref 27–34)
MCHC RBC-ENTMCNC: 35.6 GM/DL — SIGNIFICANT CHANGE UP (ref 32–36)
MCV RBC AUTO: 92.3 FL — SIGNIFICANT CHANGE UP (ref 80–100)
MONOCYTES # BLD AUTO: 0.8 K/UL — SIGNIFICANT CHANGE UP (ref 0–0.9)
MONOCYTES NFR BLD AUTO: 6.8 % — SIGNIFICANT CHANGE UP (ref 2–14)
NEUTROPHILS # BLD AUTO: 9.4 K/UL — HIGH (ref 1.8–7.4)
NEUTROPHILS NFR BLD AUTO: 77.1 % — HIGH (ref 43–77)
PLATELET # BLD AUTO: 241 K/UL — SIGNIFICANT CHANGE UP (ref 150–400)
POTASSIUM SERPL-MCNC: 4.4 MMOL/L — SIGNIFICANT CHANGE UP (ref 3.5–5.3)
POTASSIUM SERPL-SCNC: 4.4 MMOL/L — SIGNIFICANT CHANGE UP (ref 3.5–5.3)
RBC # BLD: 4.88 M/UL — SIGNIFICANT CHANGE UP (ref 4.2–5.8)
RBC # FLD: 10.3 % — SIGNIFICANT CHANGE UP (ref 10.3–14.5)
SODIUM SERPL-SCNC: 130 MMOL/L — LOW (ref 135–145)
WBC # BLD: 12.1 K/UL — HIGH (ref 3.8–10.5)
WBC # FLD AUTO: 12.1 K/UL — HIGH (ref 3.8–10.5)

## 2017-09-08 PROCEDURE — 99232 SBSQ HOSP IP/OBS MODERATE 35: CPT | Mod: GC

## 2017-09-08 RX ORDER — METOPROLOL TARTRATE 50 MG
50 TABLET ORAL
Qty: 0 | Refills: 0 | Status: DISCONTINUED | OUTPATIENT
Start: 2017-09-08 | End: 2017-09-09

## 2017-09-08 RX ORDER — ACETAMINOPHEN 500 MG
650 TABLET ORAL ONCE
Qty: 0 | Refills: 0 | Status: COMPLETED | OUTPATIENT
Start: 2017-09-08 | End: 2017-09-08

## 2017-09-08 RX ADMIN — Medication 650 MILLIGRAM(S): at 12:41

## 2017-09-08 RX ADMIN — SPIRONOLACTONE 50 MILLIGRAM(S): 25 TABLET, FILM COATED ORAL at 05:52

## 2017-09-08 RX ADMIN — SPIRONOLACTONE 50 MILLIGRAM(S): 25 TABLET, FILM COATED ORAL at 17:36

## 2017-09-08 RX ADMIN — ATORVASTATIN CALCIUM 80 MILLIGRAM(S): 80 TABLET, FILM COATED ORAL at 21:05

## 2017-09-08 RX ADMIN — Medication 650 MILLIGRAM(S): at 03:40

## 2017-09-08 RX ADMIN — Medication 650 MILLIGRAM(S): at 06:42

## 2017-09-08 RX ADMIN — Medication 650 MILLIGRAM(S): at 13:15

## 2017-09-08 RX ADMIN — Medication 25 MILLIGRAM(S): at 08:52

## 2017-09-08 RX ADMIN — HEPARIN SODIUM 5000 UNIT(S): 5000 INJECTION INTRAVENOUS; SUBCUTANEOUS at 05:53

## 2017-09-08 RX ADMIN — Medication 50 MILLIGRAM(S): at 17:36

## 2017-09-08 RX ADMIN — Medication 650 MILLIGRAM(S): at 21:05

## 2017-09-08 RX ADMIN — AMLODIPINE BESYLATE 10 MILLIGRAM(S): 2.5 TABLET ORAL at 05:52

## 2017-09-08 RX ADMIN — Medication 650 MILLIGRAM(S): at 07:15

## 2017-09-08 RX ADMIN — Medication 650 MILLIGRAM(S): at 02:44

## 2017-09-08 RX ADMIN — HEPARIN SODIUM 5000 UNIT(S): 5000 INJECTION INTRAVENOUS; SUBCUTANEOUS at 17:36

## 2017-09-08 NOTE — PROGRESS NOTE ADULT - SUBJECTIVE AND OBJECTIVE BOX
Patient is a 52y old  Male who presents with a chief complaint of weakness (06 Sep 2017 09:49)      SUBJECTIVE / OVERNIGHT EVENTS: c/o weakness  Review of Systems  chest pain no  palpitations no  sob no  nausea no  headache no    MEDICATIONS  (STANDING):  heparin  Injectable 5000 Unit(s) SubCutaneous every 12 hours  spironolactone 50 milliGRAM(s) Oral two times a day  amLODIPine   Tablet 10 milliGRAM(s) Oral daily  atorvastatin 80 milliGRAM(s) Oral at bedtime  metoprolol 25 milliGRAM(s) Oral two times a day    MEDICATIONS  (PRN):  acetaminophen   Tablet. 650 milliGRAM(s) Oral every 6 hours PRN Moderate Pain (4 - 6)          PHYSICAL EXAM:  GENERAL: NAD, well-developed  HEAD:  Atraumatic, Normocephalic  EYES: EOMI, PERRLA, conjunctiva and sclera clear  NECK: Supple, No JVD  CHEST/LUNG: Clear to auscultation bilaterally; No wheeze  HEART: Regular rate and rhythm; No murmurs, rubs, or gallops  ABDOMEN: Soft, Nontender, Nondistended; Bowel sounds present  EXTREMITIES:  2+ Peripheral Pulses, No clubbing, cyanosis, or edema  PSYCH: AAOx3  NEUROLOGY: non-focal  SKIN: No rashes or lesions    LABS:                        16.0   12.1  )-----------( 241      ( 08 Sep 2017 13:40 )             45.1     09-08    130<L>  |  95<L>  |  26<H>  ----------------------------<  133<H>  4.4   |  22  |  1.13    Ca    9.4      08 Sep 2017 14:31                RADIOLOGY & ADDITIONAL TESTS:    Imaging Personally Reviewed:    Consultant(s) Notes Reviewed:      Care Discussed with Consultants/Other Providers:

## 2017-09-08 NOTE — PROGRESS NOTE ADULT - ASSESSMENT
52 m with   weakness  HTN control- started Amlodipine 10.  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  MARIUM?- follow, improving   OTP cathecolamine/metanephrine results pending..  Santhosh Linares MD pager [phone] 52 m with   weakness  HTN control- started Amlodipine 10.  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  MARIUM?- follow, improving   cathecolamine/metanephrine 24 h uriner( was not done as OTP).  Santhosh Linares MD pager [phone]

## 2017-09-08 NOTE — PROGRESS NOTE ADULT - SUBJECTIVE AND OBJECTIVE BOX
[facility]   [phone]  RENAL FOLLOW UP NOTE  --------------------------------------------------------------------------------  Chief Complaint:    24 hour events/subjective:    still with hd and feels weak overall. no blurry vision     PAST HISTORY  --------------------------------------------------------------------------------  No significant changes to PMH, PSH, FHx, SHx, unless otherwise noted    ALLERGIES & MEDICATIONS  --------------------------------------------------------------------------------  Allergies    No Known Allergies    Intolerances      Standing Inpatient Medications  heparin  Injectable 5000 Unit(s) SubCutaneous every 12 hours  spironolactone 50 milliGRAM(s) Oral two times a day  amLODIPine   Tablet 10 milliGRAM(s) Oral daily  atorvastatin 80 milliGRAM(s) Oral at bedtime  metoprolol 50 milliGRAM(s) Oral two times a day    PRN Inpatient Medications  acetaminophen   Tablet. 650 milliGRAM(s) Oral every 6 hours PRN      REVIEW OF SYSTEMS  --------------------------------------------------------------------------------    Gen: denies fevers/chills,  CVS: denies chest pain/palpitations  Resp: denies SOB/Cough  GI: Denies N/V/Abd pain  : Denies dysuria/oliguria/hematuria    All other systems were reviewed and are negative, except as noted.    VITALS/PHYSICAL EXAM  --------------------------------------------------------------------------------  T(C): 36.7 (09-08-17 @ 11:53), Max: 36.8 (09-08-17 @ 04:21)  HR: 72 (09-08-17 @ 11:53) (72 - 82)  BP: 148/97 (09-08-17 @ 11:53) (145/88 - 158/81)  RR: 18 (09-08-17 @ 11:53) (18 - 18)  SpO2: 99% (09-08-17 @ 11:53) (98% - 99%)  Wt(kg): --        09-07-17 @ 07:01  -  09-08-17 @ 07:00  --------------------------------------------------------  IN: 1220 mL / OUT: 0 mL / NET: 1220 mL    09-08-17 @ 07:01  -  09-08-17 @ 15:14  --------------------------------------------------------  IN: 860 mL / OUT: 0 mL / NET: 860 mL      Physical Exam:  	    Physical Exam:  	Gen: alert oriented place person and date   	Pulm: Decreased breath sounds b/l bases. no rales or ronchi or wheezing  	CV: RRR, S1/S2. no rub  	Back: No CVA tenderness; no sacral edema  	Abd: +BS, soft, nontender/nondistended  	: No suprapubic tenderness.               Extremity: No cyanosis, no edema no clubbing  	LABS/STUDIES  --------------------------------------------------------------------------------              16.0   12.1  >-----------<  241      [09-08-17 @ 13:40]              45.1     130  |  95  |  26  ----------------------------<  133      [09-08-17 @ 14:31]  4.4   |  22  |  1.13        Ca     9.4     [09-08-17 @ 14:31]            Creatinine Trend:  SCr 1.13 [09-08 @ 14:31]  SCr 1.45 [09-07 @ 14:03]  SCr 1.42 [09-07 @ 08:58]  SCr 1.67 [09-06 @ 11:52]  SCr 1.62 [09-05 @ 10:24]              Urinalysis - [09-05-17 @ 13:38]      Color Yellow / Appearance Clear / SG 1.025 / pH 6.0      Gluc Negative / Ketone Negative  / Bili Negative / Urobili Negative       Blood Negative / Protein 30 / Leuk Est Negative / Nitrite Negative      RBC 3-5 / WBC 6-10 / Hyaline 2-5 / Gran  / Sq Epi  / Non Sq Epi OCC / Bacteria

## 2017-09-08 NOTE — PROGRESS NOTE ADULT - PROBLEM SELECTOR PLAN 1
- Likely 2/2 R benign adrenal adenoma/hyperplasia which increased in size to 2.5cm from 1cm on 5/14  - Improved BP, 145-159/  - Hold ACE given pt w/ new MARIUM  - F/u results of outpt catecholamine and metanephrines  - Dexamethasone suppression test appropriate  - C/w norvasc 10mg daily, lopressor 25mg BID, spironolactone 50mg BID  - Consider increasing lopressor dose as pt's HR can tolerate - Likely 2/2 R benign adrenal adenoma/hyperplasia which increased in size to 2.5cm from 1cm on 5/14  - Improved BP, 145-159/  - Hold ACE given pt w/ new MARIUM  - F/u results of outpt catecholamine and metanephrines  - Dexamethasone suppression test appropriate without evidence of subclinical cushing's  - C/w norvasc 10mg daily, lopressor 25mg BID, spironolactone 50mg BID  - Consider increasing lopressor dose as pt's HR can tolerate

## 2017-09-08 NOTE — PROGRESS NOTE ADULT - PROBLEM SELECTOR PLAN 2
- Likely 2/2 increased spironolactone dose vs. HCTZ   - Na 128 -> 132 -> 130 -> 132  - C/w spironolactone 50mg BID  - Consider fluid restriction if does not improve  - Obtain urine Na, TFTs to further evaluate  - Daily BMP to monitor

## 2017-09-08 NOTE — PROGRESS NOTE ADULT - ASSESSMENT
· Assessment		  53 yo M w/ hx Conn syndrome s/p L adrenalectomy 3 years ago p/w N/V and foggy, fatigued feeling for several days with severe htn and placed on aldactone. now with uncontrolled htn and MARIUM with hyponatremia and COnn syndrome   marium resolved.   bp still high   add losartan 25mg daily   will tolerate bp 140's/80 mmhg range currrently high.   to have 24 hour metanephrines and catecholamines  cont with aldactone 50 mg bid  one liter fluid restriction

## 2017-09-08 NOTE — PROGRESS NOTE ADULT - ATTENDING COMMENTS
Agree with assessment and plan as above by Dr. Lyon. Reviewed all pertinent labs, glucose values, and imaging studies. Modifications made as indicated above.
Agree with plan.  Complicated story that I tried to clarify with pt.  Appears he was dx with bilateral adrenal adenomas several years ago, later had left adrenalectomy for 4 cm adenoma that was non cancerous per his report on path thereafter.  Since then, has remained quite hypertensive up to 180s systolic at home recently. When I describe AVS procedure to him (to see what workup he had initially to dx primary venessa) he states he has never had this done.  I also don't have access to the biochemical testing that would have provided a diagnosis of primary venessa.  It is unclear after my discussion with him if he had repeat w/u for primary venessa after he had his adrenalectomy, but regardless, wouldn't be optimal to remove his remaining adrenal gland, so options revolve around medical therapy at this time.  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  Now hyponatremic, but notably at home was also taking HCTZ bid last dose 48 hours ago.  Unclear to me if his hyponatremia is related to residual HCTZ that hasn't washed out, or to potassium sparing diuretic which is possible although a bit less common.  Agree with plan yesterday to reduce his spironolactone to 50 mg bid.  Can consider adding another agent to his regimen (not thiazide while Na still low) such as BB if no contraindications.  As for his hyponatremia, could also consider urine Na and TFTs, and AM cortisol notably normal.  had outpt testing for hypersecretion of adrenal hormones last week with results pending.  Otherwise, his outpt endocrinologist suggested consider swithcing from spirnono to amiloride, althoguh amiloride has similar risk of hyponatremia but also likely wouldn't cause additional harm.

## 2017-09-08 NOTE — PROGRESS NOTE ADULT - ASSESSMENT
Pt is a 51 yo M w/ hx Conn syndrome 2/2 bilateral adrenal hyperplasia vs adrenal adenoma s/p L adrenalectomy 3 years ago p/w N/V, fatigue, headaches and persistent hypertension for several days, found to be hyponatremic in the setting of increasing spironolactone dosage.

## 2017-09-08 NOTE — PROGRESS NOTE ADULT - SUBJECTIVE AND OBJECTIVE BOX
INTERVAL HPI/OVERNIGHT EVENTS: Pt explains that he had a migraine and had some nausea last night. Tylenol w/ no improvement, subsided on its own. Otherwise no events. No CP, SOB, abd pain, diarrhea, fevers/chills. Tolerates PO. Voiding regularly.    MEDICATIONS  (STANDING):  heparin  Injectable 5000 Unit(s) SubCutaneous every 12 hours  spironolactone 50 milliGRAM(s) Oral two times a day  amLODIPine   Tablet 10 milliGRAM(s) Oral daily  atorvastatin 80 milliGRAM(s) Oral at bedtime  metoprolol 25 milliGRAM(s) Oral two times a day    MEDICATIONS  (PRN):  acetaminophen   Tablet. 650 milliGRAM(s) Oral every 6 hours PRN Moderate Pain (4 - 6)    Allergies    No Known Allergies      REVIEW OF SYSTEMS    Constitutional: No fever  HEENT: +HEADACHES  Cardiovascular: No chest pain, palpitations  Respiratory: No SOB, no cough  GI: +NAUSEA, No ab pain    Vital Signs Last 24 Hrs  T(C): 36.8 (08 Sep 2017 04:21), Max: 36.8 (08 Sep 2017 04:21)  T(F): 98.3 (08 Sep 2017 04:21), Max: 98.3 (08 Sep 2017 04:21)  HR: 77 (08 Sep 2017 08:50) (77 - 92)  BP: 146/91 (08 Sep 2017 08:50) (145/88 - 158/81)  BP(mean): --  RR: 18 (08 Sep 2017 04:21) (18 - 18)  SpO2: 98% (08 Sep 2017 04:21) (96% - 98%)    PHYSICAL EXAM:    GENERAL: NAD, well-groomed, well-developed  RESPIRATORY: Clear to auscultation bilaterally; No rales, rhonchi, wheezing, or rubs  CARDIOVASCULAR: RRR; No murmurs; no peripheral edema  GI: Soft, Abd soft, nontender, non distended, normal bowel sounds  MUSCULOSKELETAL: Full range of motion, normal strength  PSYCH: Alert and oriented x 3    LABS:                        16.8   12.09 )-----------( 276      ( 07 Sep 2017 09:03 )             46.0     09-07    132<L>  |  93<L>  |  33<H>  ----------------------------<  136<H>  4.6   |  26  |  1.45<H>    Ca    10.2      07 Sep 2017 14:03    TPro  8.5<H>  /  Alb  4.4  /  TBili  0.6  /  DBili  x   /  AST  22  /  ALT  41  /  AlkPhos  79  09-06    CAPILLARY BLOOD GLUCOSE    RADIOLOGY & ADDITIONAL TESTS: No interval imaging. Follow-up for Conn's Syndrome with adrenal nodule and hyponatremia.     INTERVAL HPI/OVERNIGHT EVENTS: Pt explains that he had a migraine and had some nausea last night. Tylenol w/ no improvement, subsided on its own. Otherwise no events. No CP, SOB, abd pain, diarrhea, fevers/chills. Tolerates PO. Voiding regularly.    MEDICATIONS  (STANDING):  heparin  Injectable 5000 Unit(s) SubCutaneous every 12 hours  spironolactone 50 milliGRAM(s) Oral two times a day  amLODIPine   Tablet 10 milliGRAM(s) Oral daily  atorvastatin 80 milliGRAM(s) Oral at bedtime  metoprolol 25 milliGRAM(s) Oral two times a day    MEDICATIONS  (PRN):  acetaminophen   Tablet. 650 milliGRAM(s) Oral every 6 hours PRN Moderate Pain (4 - 6)    Allergies    No Known Allergies      REVIEW OF SYSTEMS    Constitutional: No fever +fatigue  HEENT: +HEADACHES  Cardiovascular: No chest pain, palpitations  Respiratory: No SOB, no cough  GI: +NAUSEA, No ab pain    Vital Signs Last 24 Hrs  T(C): 36.8 (08 Sep 2017 04:21), Max: 36.8 (08 Sep 2017 04:21)  T(F): 98.3 (08 Sep 2017 04:21), Max: 98.3 (08 Sep 2017 04:21)  HR: 77 (08 Sep 2017 08:50) (77 - 92)  BP: 146/91 (08 Sep 2017 08:50) (145/88 - 158/81)  BP(mean): --  RR: 18 (08 Sep 2017 04:21) (18 - 18)  SpO2: 98% (08 Sep 2017 04:21) (96% - 98%)    PHYSICAL EXAM:    GENERAL: NAD, well-groomed, well-developed  RESPIRATORY: Clear to auscultation bilaterally; No rales, rhonchi, wheezing, or rubs  CARDIOVASCULAR: RRR; No murmurs; no peripheral edema  GI: Soft, Abd soft, nontender, non distended, normal bowel sounds  MUSCULOSKELETAL: Full range of motion, normal strength  PSYCH: Alert and oriented x 3    LABS:                        16.8   12.09 )-----------( 276      ( 07 Sep 2017 09:03 )             46.0     09-07    132<L>  |  93<L>  |  33<H>  ----------------------------<  136<H>  4.6   |  26  |  1.45<H>    Ca    10.2      07 Sep 2017 14:03    TPro  8.5<H>  /  Alb  4.4  /  TBili  0.6  /  DBili  x   /  AST  22  /  ALT  41  /  AlkPhos  79  09-06    CAPILLARY BLOOD GLUCOSE    RADIOLOGY & ADDITIONAL TESTS: No interval imaging.

## 2017-09-09 VITALS
DIASTOLIC BLOOD PRESSURE: 89 MMHG | HEART RATE: 79 BPM | SYSTOLIC BLOOD PRESSURE: 134 MMHG | RESPIRATION RATE: 18 BRPM | OXYGEN SATURATION: 98 % | TEMPERATURE: 98 F

## 2017-09-09 LAB
ANION GAP SERPL CALC-SCNC: 15 MMOL/L — SIGNIFICANT CHANGE UP (ref 5–17)
BASOPHILS # BLD AUTO: 0.05 K/UL — SIGNIFICANT CHANGE UP (ref 0–0.2)
BASOPHILS NFR BLD AUTO: 0.5 % — SIGNIFICANT CHANGE UP (ref 0–2)
BUN SERPL-MCNC: 27 MG/DL — HIGH (ref 7–23)
CALCIUM SERPL-MCNC: 9.5 MG/DL — SIGNIFICANT CHANGE UP (ref 8.4–10.5)
CHLORIDE SERPL-SCNC: 95 MMOL/L — LOW (ref 96–108)
CO2 SERPL-SCNC: 23 MMOL/L — SIGNIFICANT CHANGE UP (ref 22–31)
CREAT SERPL-MCNC: 1.38 MG/DL — HIGH (ref 0.5–1.3)
EOSINOPHIL # BLD AUTO: 0.25 K/UL — SIGNIFICANT CHANGE UP (ref 0–0.5)
EOSINOPHIL NFR BLD AUTO: 2.4 % — SIGNIFICANT CHANGE UP (ref 0–6)
GLUCOSE SERPL-MCNC: 95 MG/DL — SIGNIFICANT CHANGE UP (ref 70–99)
HCT VFR BLD CALC: 43.9 % — SIGNIFICANT CHANGE UP (ref 39–50)
HGB BLD-MCNC: 15.7 G/DL — SIGNIFICANT CHANGE UP (ref 13–17)
IMM GRANULOCYTES NFR BLD AUTO: 0.6 % — SIGNIFICANT CHANGE UP (ref 0–1.5)
LYMPHOCYTES # BLD AUTO: 3.26 K/UL — SIGNIFICANT CHANGE UP (ref 1–3.3)
LYMPHOCYTES # BLD AUTO: 31.6 % — SIGNIFICANT CHANGE UP (ref 13–44)
MCHC RBC-ENTMCNC: 32 PG — SIGNIFICANT CHANGE UP (ref 27–34)
MCHC RBC-ENTMCNC: 35.8 GM/DL — SIGNIFICANT CHANGE UP (ref 32–36)
MCV RBC AUTO: 89.4 FL — SIGNIFICANT CHANGE UP (ref 80–100)
MONOCYTES # BLD AUTO: 1.41 K/UL — HIGH (ref 0–0.9)
MONOCYTES NFR BLD AUTO: 13.6 % — SIGNIFICANT CHANGE UP (ref 2–14)
NEUTROPHILS # BLD AUTO: 5.3 K/UL — SIGNIFICANT CHANGE UP (ref 1.8–7.4)
NEUTROPHILS NFR BLD AUTO: 51.3 % — SIGNIFICANT CHANGE UP (ref 43–77)
PLATELET # BLD AUTO: 258 K/UL — SIGNIFICANT CHANGE UP (ref 150–400)
POTASSIUM SERPL-MCNC: 4.9 MMOL/L — SIGNIFICANT CHANGE UP (ref 3.5–5.3)
POTASSIUM SERPL-SCNC: 4.9 MMOL/L — SIGNIFICANT CHANGE UP (ref 3.5–5.3)
RBC # BLD: 4.91 M/UL — SIGNIFICANT CHANGE UP (ref 4.2–5.8)
RBC # FLD: 11.8 % — SIGNIFICANT CHANGE UP (ref 10.3–14.5)
SODIUM SERPL-SCNC: 133 MMOL/L — LOW (ref 135–145)
SODIUM UR-SCNC: 50 MMOL/L — SIGNIFICANT CHANGE UP
TSH SERPL-MCNC: 3.04 UIU/ML — SIGNIFICANT CHANGE UP (ref 0.27–4.2)
WBC # BLD: 10.33 K/UL — SIGNIFICANT CHANGE UP (ref 3.8–10.5)
WBC # FLD AUTO: 10.33 K/UL — SIGNIFICANT CHANGE UP (ref 3.8–10.5)

## 2017-09-09 PROCEDURE — 84132 ASSAY OF SERUM POTASSIUM: CPT

## 2017-09-09 PROCEDURE — 85027 COMPLETE CBC AUTOMATED: CPT

## 2017-09-09 PROCEDURE — 84100 ASSAY OF PHOSPHORUS: CPT

## 2017-09-09 PROCEDURE — 84443 ASSAY THYROID STIM HORMONE: CPT

## 2017-09-09 PROCEDURE — 80053 COMPREHEN METABOLIC PANEL: CPT

## 2017-09-09 PROCEDURE — 82553 CREATINE MB FRACTION: CPT

## 2017-09-09 PROCEDURE — 82565 ASSAY OF CREATININE: CPT

## 2017-09-09 PROCEDURE — 82384 ASSAY THREE CATECHOLAMINES: CPT

## 2017-09-09 PROCEDURE — 84300 ASSAY OF URINE SODIUM: CPT

## 2017-09-09 PROCEDURE — 80048 BASIC METABOLIC PNL TOTAL CA: CPT

## 2017-09-09 PROCEDURE — 82435 ASSAY OF BLOOD CHLORIDE: CPT

## 2017-09-09 PROCEDURE — 83835 ASSAY OF METANEPHRINES: CPT

## 2017-09-09 PROCEDURE — 83605 ASSAY OF LACTIC ACID: CPT

## 2017-09-09 PROCEDURE — 93005 ELECTROCARDIOGRAM TRACING: CPT

## 2017-09-09 PROCEDURE — 81001 URINALYSIS AUTO W/SCOPE: CPT

## 2017-09-09 PROCEDURE — 71045 X-RAY EXAM CHEST 1 VIEW: CPT

## 2017-09-09 PROCEDURE — 99285 EMERGENCY DEPT VISIT HI MDM: CPT | Mod: 25

## 2017-09-09 PROCEDURE — 82024 ASSAY OF ACTH: CPT

## 2017-09-09 PROCEDURE — 85014 HEMATOCRIT: CPT

## 2017-09-09 PROCEDURE — 70450 CT HEAD/BRAIN W/O DYE: CPT

## 2017-09-09 PROCEDURE — 82947 ASSAY GLUCOSE BLOOD QUANT: CPT

## 2017-09-09 PROCEDURE — 96374 THER/PROPH/DIAG INJ IV PUSH: CPT

## 2017-09-09 PROCEDURE — 82330 ASSAY OF CALCIUM: CPT

## 2017-09-09 PROCEDURE — 85730 THROMBOPLASTIN TIME PARTIAL: CPT

## 2017-09-09 PROCEDURE — 84484 ASSAY OF TROPONIN QUANT: CPT

## 2017-09-09 PROCEDURE — 85610 PROTHROMBIN TIME: CPT

## 2017-09-09 PROCEDURE — 84295 ASSAY OF SERUM SODIUM: CPT

## 2017-09-09 PROCEDURE — 82533 TOTAL CORTISOL: CPT

## 2017-09-09 PROCEDURE — 82803 BLOOD GASES ANY COMBINATION: CPT

## 2017-09-09 PROCEDURE — 82550 ASSAY OF CK (CPK): CPT

## 2017-09-09 PROCEDURE — 83735 ASSAY OF MAGNESIUM: CPT

## 2017-09-09 PROCEDURE — 96375 TX/PRO/DX INJ NEW DRUG ADDON: CPT

## 2017-09-09 RX ORDER — LOSARTAN POTASSIUM 100 MG/1
25 TABLET, FILM COATED ORAL DAILY
Qty: 0 | Refills: 0 | Status: DISCONTINUED | OUTPATIENT
Start: 2017-09-09 | End: 2017-09-09

## 2017-09-09 RX ORDER — METOPROLOL TARTRATE 50 MG
1 TABLET ORAL
Qty: 60 | Refills: 0 | OUTPATIENT
Start: 2017-09-09 | End: 2017-10-09

## 2017-09-09 RX ORDER — AMLODIPINE BESYLATE 2.5 MG/1
1 TABLET ORAL
Qty: 30 | Refills: 0 | OUTPATIENT
Start: 2017-09-09 | End: 2017-10-09

## 2017-09-09 RX ORDER — METOPROLOL TARTRATE 50 MG
1 TABLET ORAL
Qty: 0 | Refills: 0 | COMMUNITY
Start: 2017-09-09

## 2017-09-09 RX ORDER — AMLODIPINE BESYLATE 2.5 MG/1
1 TABLET ORAL
Qty: 0 | Refills: 0 | COMMUNITY
Start: 2017-09-09

## 2017-09-09 RX ORDER — LOSARTAN POTASSIUM 100 MG/1
1 TABLET, FILM COATED ORAL
Qty: 30 | Refills: 0 | OUTPATIENT
Start: 2017-09-09 | End: 2017-10-09

## 2017-09-09 RX ADMIN — AMLODIPINE BESYLATE 10 MILLIGRAM(S): 2.5 TABLET ORAL at 06:00

## 2017-09-09 RX ADMIN — HEPARIN SODIUM 5000 UNIT(S): 5000 INJECTION INTRAVENOUS; SUBCUTANEOUS at 06:00

## 2017-09-09 RX ADMIN — LOSARTAN POTASSIUM 25 MILLIGRAM(S): 100 TABLET, FILM COATED ORAL at 12:06

## 2017-09-09 RX ADMIN — Medication 50 MILLIGRAM(S): at 06:00

## 2017-09-09 RX ADMIN — SPIRONOLACTONE 50 MILLIGRAM(S): 25 TABLET, FILM COATED ORAL at 06:00

## 2017-09-09 NOTE — DOWNTIME INTERRUPTION NOTE - WHICH MANUAL FORMS INITIATED?
See paper records for clinical information entered during the [place] downtime.  Downtime protocol was followed for this chart. Orders were checked and backloaded by the providers, if necessary, and reviewed by downtime backloading captains.

## 2017-09-09 NOTE — PROGRESS NOTE ADULT - SUBJECTIVE AND OBJECTIVE BOX
Patient is a 52y old  Male who presents with a chief complaint of weakness (06 Sep 2017 09:49)      SUBJECTIVE / OVERNIGHT EVENTS: Comfortable without new complaints.   Review of Systems  chest pain no  palpitations no  sob no  nausea no  headache no    MEDICATIONS  (STANDING):  heparin  Injectable 5000 Unit(s) SubCutaneous every 12 hours  spironolactone 50 milliGRAM(s) Oral two times a day  amLODIPine   Tablet 10 milliGRAM(s) Oral daily  atorvastatin 80 milliGRAM(s) Oral at bedtime  metoprolol 50 milliGRAM(s) Oral two times a day  losartan 25 milliGRAM(s) Oral daily    MEDICATIONS  (PRN):  acetaminophen   Tablet. 650 milliGRAM(s) Oral every 6 hours PRN Moderate Pain (4 - 6)          PHYSICAL EXAM:  GENERAL: NAD, well-developed  HEAD:  Atraumatic, Normocephalic  EYES: EOMI, PERRLA, conjunctiva and sclera clear  NECK: Supple, No JVD  CHEST/LUNG: Clear to auscultation bilaterally; No wheeze  HEART: Regular rate and rhythm; No murmurs, rubs, or gallops  ABDOMEN: Soft, Nontender, Nondistended; Bowel sounds present  EXTREMITIES:  2+ Peripheral Pulses, No clubbing, cyanosis, or edema  PSYCH: AAOx3  NEUROLOGY: non-focal  SKIN: No rashes or lesions    LABS:                        15.7   10.33 )-----------( 258      ( 09 Sep 2017 08:25 )             43.9     09-09    133<L>  |  95<L>  |  27<H>  ----------------------------<  95  4.9   |  23  |  1.38<H>    Ca    9.5      09 Sep 2017 08:21                RADIOLOGY & ADDITIONAL TESTS:    Imaging Personally Reviewed:    Consultant(s) Notes Reviewed:      Care Discussed with Consultants/Other Providers:

## 2017-09-09 NOTE — PROGRESS NOTE ADULT - ASSESSMENT
52 m with   weakness  HTN better controlled- started Amlodipine 10.  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  MARIUM?- follow, improving   cathecolamine/metanephrine 24 h uriner( was not done as OTP).  DC home with follow with endocrine svc, nephrology and PMD. QA  Santhosh Linares MD pager [phone]

## 2017-09-12 LAB
METANEPHRINE, PL: 13 PG/ML — SIGNIFICANT CHANGE UP (ref 0–62)
NORMETANEPHRINE, PL: 68 PG/ML — SIGNIFICANT CHANGE UP (ref 0–145)

## 2017-09-14 LAB
DOPAMINE SERPL-MCNC: <30 PG/ML — SIGNIFICANT CHANGE UP (ref 0–48)
EPINEPH PLAS-MCNC: <15 PG/ML — SIGNIFICANT CHANGE UP (ref 0–62)
NOREPINEPH PLAS-MCNC: 214 PG/ML — SIGNIFICANT CHANGE UP (ref 0–874)

## 2020-12-08 DIAGNOSIS — Z20.828 CONTACT WITH AND (SUSPECTED) EXPOSURE TO OTHER VIRAL COMMUNICABLE DISEASES: ICD-10-CM

## 2020-12-08 PROBLEM — I10 ESSENTIAL (PRIMARY) HYPERTENSION: Chronic | Status: ACTIVE | Noted: 2017-08-18

## 2020-12-08 PROBLEM — G43.909 MIGRAINE, UNSPECIFIED, NOT INTRACTABLE, WITHOUT STATUS MIGRAINOSUS: Chronic | Status: ACTIVE | Noted: 2017-09-05

## 2020-12-08 PROBLEM — E78.00 PURE HYPERCHOLESTEROLEMIA, UNSPECIFIED: Chronic | Status: ACTIVE | Noted: 2017-09-05

## 2020-12-08 PROBLEM — E26.01 CONN'S SYNDROME: Chronic | Status: ACTIVE | Noted: 2017-09-05

## 2021-01-13 ENCOUNTER — APPOINTMENT (OUTPATIENT)
Dept: PULMONOLOGY | Facility: CLINIC | Age: 56
End: 2021-01-13

## 2021-05-14 ENCOUNTER — APPOINTMENT (OUTPATIENT)
Dept: PEDIATRIC ALLERGY IMMUNOLOGY | Facility: CLINIC | Age: 56
End: 2021-05-14
Payer: COMMERCIAL

## 2021-05-14 DIAGNOSIS — R10.9 UNSPECIFIED ABDOMINAL PAIN: ICD-10-CM

## 2021-05-14 DIAGNOSIS — Z80.0 FAMILY HISTORY OF MALIGNANT NEOPLASM OF DIGESTIVE ORGANS: ICD-10-CM

## 2021-05-14 DIAGNOSIS — Z13.29 ENCOUNTER FOR SCREENING FOR OTHER SUSPECTED ENDOCRINE DISORDER: ICD-10-CM

## 2021-05-14 DIAGNOSIS — Z13.228 ENCOUNTER FOR SCREENING FOR OTHER SUSPECTED ENDOCRINE DISORDER: ICD-10-CM

## 2021-05-14 DIAGNOSIS — Z13.0 ENCOUNTER FOR SCREENING FOR OTHER SUSPECTED ENDOCRINE DISORDER: ICD-10-CM

## 2021-05-14 PROCEDURE — 99203 OFFICE O/P NEW LOW 30 MIN: CPT | Mod: 95

## 2021-05-21 ENCOUNTER — NON-APPOINTMENT (OUTPATIENT)
Age: 56
End: 2021-05-21

## 2021-05-23 PROBLEM — Z13.29 SCREENING FOR ENDOCRINE/METABOLIC/IMMUNITY DISORDERS: Status: ACTIVE | Noted: 2021-05-20

## 2021-05-23 PROBLEM — R10.9 ABDOMINAL PAIN: Status: ACTIVE | Noted: 2021-05-23

## 2021-05-23 NOTE — HISTORY OF PRESENT ILLNESS
[Home] : at home, [unfilled] , at the time of the visit. [Other Location: e.g. Home (Enter Location, City,State)___] : at [unfilled] [Verbal consent obtained from patient] : the patient, [unfilled] [de-identified] : Scott Bailey is a 55 year old man with HLD and 1 adrenal gland (due to a noncancerous adrenal mass), sarcoidosis who presents due to GI pain.\par \par A few weeks ago his stomach started rumbling. Since that time he has not been back to normal\par \par Workup thus far:\par Normal endocriology w/u - 1 adrenal gland\par Normal endoscopy and colonoscopy\par Normal cardiology evaluation - stress test, ECHO.\par \par 5 weeks ago he had a lot of gurgling in his stomach. Felt unwell. Since that day he has had pain in his ribcage - ront left - side of his heart and mid-upper back and flank. Soreness and discomfort. Very cloudy in his head.\par No emesis or diarrhea.\par No hive or angioedema.\par Had imaging which showed a small spot and possibly lymph nodes in his chest. DIagnosed with sarcoidosis.\par PET scan in December -found the sarcoidosis. Followed by pulmonologist - Mayco Parmar - affiliated with [facility].\par When he laughs he starts to cough, and sometimes when he talks he is raspy. No nasal congestion, no mucous dripping.\par \par Taking pepcid BID - famoitidine.\par \par No specific foods have been bothering.All day, everyday he has discomfort and lethargy. No abdominal distension, no gas.\par No appetite, not eating as big portions- some weight loss - 10 pounds over 2 months.\par \par No hives after eating, no angioed\par \par Pending MRI abdomen form his endocrinologist.\par \par No infection history. Rarely sick. Can't recall his last abx use.\par No sinus infection, pneumonia, bronchitis.

## 2021-05-23 NOTE — REVIEW OF SYSTEMS
[Nl] : Genitourinary [Immunizations are up to date] : Immunizations are up to date [Received Influenza Vaccine this Past Year] : patient has received the Influenza vaccine this past year [FreeTextEntry7] : abdominal pain [FreeTextEntry8] : tinnitus

## 2021-05-23 NOTE — SOCIAL HISTORY
[House] : [unfilled] lives in a house  [FreeTextEntry2] :  of a school district [Smokers in Household] : there are no smokers in the home [de-identified] : 3 cats [de-identified] : never smoked

## 2021-05-26 ENCOUNTER — APPOINTMENT (OUTPATIENT)
Dept: ORTHOPEDIC SURGERY | Facility: CLINIC | Age: 56
End: 2021-05-26
Payer: COMMERCIAL

## 2021-05-26 VITALS — HEART RATE: 76 BPM | SYSTOLIC BLOOD PRESSURE: 154 MMHG | DIASTOLIC BLOOD PRESSURE: 81 MMHG

## 2021-05-26 DIAGNOSIS — M54.5 LOW BACK PAIN: ICD-10-CM

## 2021-05-26 DIAGNOSIS — M54.2 CERVICALGIA: ICD-10-CM

## 2021-05-26 DIAGNOSIS — M54.12 RADICULOPATHY, CERVICAL REGION: ICD-10-CM

## 2021-05-26 DIAGNOSIS — M54.16 RADICULOPATHY, LUMBAR REGION: ICD-10-CM

## 2021-05-26 PROCEDURE — 99203 OFFICE O/P NEW LOW 30 MIN: CPT

## 2021-05-26 PROCEDURE — 99072 ADDL SUPL MATRL&STAF TM PHE: CPT

## 2021-05-26 PROCEDURE — 72040 X-RAY EXAM NECK SPINE 2-3 VW: CPT

## 2021-05-26 PROCEDURE — 72100 X-RAY EXAM L-S SPINE 2/3 VWS: CPT

## 2021-05-26 NOTE — DISCUSSION/SUMMARY
[de-identified] : Given his worsening symptoms refractory to conservative management with chiropractic care MRIs of his cervical and lumbar spine will be obtained.  Follow-up afterwards.

## 2021-05-26 NOTE — HISTORY OF PRESENT ILLNESS
[de-identified] : Mr. MARIOLA ESQUIVEL  is a 55 year old male who presents with 7 weeks of abdominal pain and low back pain.  His right leg feels weak.  He does have a chronic history of neck stiffness and low back pain.   Normal bowel and bladder control.   Denies any recent fevers, chills, sweats, weight loss, or infection.\par \par The patients past medical history, past surgical history, medications, allergies, and social history were reviewed by me today with the patient and documented accordingly.  In addition, the patient's family history, which is noncontributory to their visit, was also reviewed.\par

## 2021-05-26 NOTE — PHYSICAL EXAM
[Antalgic] : antalgic [Stooped] : stooped [de-identified] : Examination of the cervical spine reveals no midline or paraspinal tenderness to palpation. No cervical lymphadenopathy. Decreased range of motion with respect to flexion, extension, rotation, and lateral bending. Negative Spurlings. Negative Lhermitte's. Full range of motion bilateral shoulders without evidence of impingement. No instability of bilateral upper extremities.  Cranial nerves II through XII grossly intact. Intact sensation bilateral upper extremities. 5/5 deltoids biceps triceps wrist extensors wrist flexors finger flexors and hand intrinsics. 1+ biceps triceps and brachioradialis reflexes. Negative Keys's. 2+ radial pulse. Negative Tinel's over the cubital and carpal tunnel. No skin lesions on the right and left upper extremities.\par \par Examination of the lumbar spine reveals no midline tenderness palpation, step-offs, or skin lesions. Decreased range of motion with respect to flexion, extension, lateral bending, and rotation. No tenderness to palpation of the sciatic notch. No tenderness palpation of the bilateral greater trochanters. No pain with passive internal/external rotation of the hips. No instability of bilateral lower extremities.  Negative ZHANG. Negative straight leg raise bilaterally. No bowstring. Negative femoral stretch. 5 out of 5 iliopsoas, hip abductors, hips adductors, quadriceps, hamstrings, gastrocsoleus, tibialis anterior, extensor hallucis longus, peroneals. Grossly intact sensation to light touch bilateral lower extremities. 1+ patellar and Achilles reflexes. Downgoing Babinski. No clonus. Intact proprioception. Palpable pulses. No skin lesion and no edema on the right and left lower extremities. [de-identified] : AP lateral cervical x-rays reveal some spondylosis without instability or aggressive lesions\par \par AP lateral lumbar x-rays reveal some spondylosis without instability or aggressive lesions.

## 2021-05-27 ENCOUNTER — TRANSCRIPTION ENCOUNTER (OUTPATIENT)
Age: 56
End: 2021-05-27

## 2021-06-28 ENCOUNTER — APPOINTMENT (OUTPATIENT)
Dept: MRI IMAGING | Facility: CLINIC | Age: 56
End: 2021-06-28

## 2021-12-26 NOTE — ED ADULT NURSE NOTE - WEIGHT IN LBS
Med rec completed by pharm dept:[phone]    Please note info highlighted in yellow on pt's med list    Meds removed from pt med list:  Norco  Toprol     179.8

## 2022-04-13 ENCOUNTER — APPOINTMENT (OUTPATIENT)
Dept: MRI IMAGING | Facility: IMAGING CENTER | Age: 57
End: 2022-04-13

## 2022-05-04 ENCOUNTER — APPOINTMENT (OUTPATIENT)
Dept: RHEUMATOLOGY | Facility: CLINIC | Age: 57
End: 2022-05-04

## 2022-05-31 NOTE — ED PROVIDER NOTE - NS ED MD DISPO ADMIT [facility] UNIT
Benefits, risks, and possible complications of procedure explained to patient/caregiver who verbalized understanding and gave verbal consent.
MED/SURG

## 2022-10-12 ENCOUNTER — APPOINTMENT (OUTPATIENT)
Dept: THORACIC SURGERY | Facility: CLINIC | Age: 57
End: 2022-10-12

## 2022-10-12 VITALS
SYSTOLIC BLOOD PRESSURE: 184 MMHG | HEIGHT: 68 IN | WEIGHT: 195 LBS | DIASTOLIC BLOOD PRESSURE: 112 MMHG | HEART RATE: 75 BPM | BODY MASS INDEX: 29.55 KG/M2 | OXYGEN SATURATION: 98 %

## 2022-10-12 DIAGNOSIS — Z86.59 PERSONAL HISTORY OF OTHER MENTAL AND BEHAVIORAL DISORDERS: ICD-10-CM

## 2022-10-12 DIAGNOSIS — Z86.79 PERSONAL HISTORY OF OTHER DISEASES OF THE CIRCULATORY SYSTEM: ICD-10-CM

## 2022-10-12 DIAGNOSIS — R59.0 LOCALIZED ENLARGED LYMPH NODES: ICD-10-CM

## 2022-10-12 PROCEDURE — 99245 OFF/OP CONSLTJ NEW/EST HI 55: CPT

## 2022-10-13 PROBLEM — R59.0 MEDIASTINAL LYMPHADENOPATHY: Status: ACTIVE | Noted: 2022-10-13

## 2022-10-13 PROBLEM — Z86.59 HISTORY OF DEPRESSION: Status: RESOLVED | Noted: 2022-10-13 | Resolved: 2022-10-13

## 2022-10-13 PROBLEM — Z86.79 HISTORY OF HYPERTENSION: Status: RESOLVED | Noted: 2022-10-13 | Resolved: 2022-10-13

## 2022-10-14 NOTE — HISTORY OF PRESENT ILLNESS
[FreeTextEntry1] : Mr. MARIOLA ESQUIVEL, 57 year old male, never smoker, w/ hx of HTN, depression, who presents with complaints of shortness of breath. PT otherwise denies fever, chills, cough or chest pain.\par \par PET/CT on 01/23/2021: ([facility])\par - There is intense FDG activity corresponding with mediastinal and bilateral hilar lymph nodes. Findings are likely infectious/inflammatory nature. Malignancy is less likely. Follow-up as clinically indicated.\par - There is moderately increased FDG activity corresponding with a right adrenal lesion, slightly increased when compared with adjacent hepatic parenchyma. Malignancy cannot be excluded. Follow-up as clinically indicated.\par - The patient is status post left adrenalectomy. There is no focal abnormal FDG activity in the left adrenal bed.\par \par CT chest on 09/27/2022: ([facility])\par - There are no visualized pulmonary parenchymal abnormalities.\par - Possible small hiatal hernia.\par - Mild coronary artery calcifications.\par - Postsurgical changes from prior left adrenalectomy.\par \par Patient is here today for CT surgery consultation, self referred. Patient c/o weak, fatigue, weak voice, SOB when taking or laughing, denies dysphagia, reflux, n&v, chest pain, weight stable.

## 2022-10-14 NOTE — ASSESSMENT
[FreeTextEntry1] : Mr. MARIOLA ESQUIVEL, 57 year old male, never smoker, w/ hx of HTN, depression, who presents with complaints of shortness of breath. PT otherwise denies fever, chills, cough or chest pain.\par \par PET/CT on 01/23/2021: ([facility])\par - There is intense FDG activity corresponding with mediastinal and bilateral hilar lymph nodes. Findings are likely infectious/inflammatory nature. Malignancy is less likely. Follow-up as clinically indicated.\par - There is moderately increased FDG activity corresponding with a right adrenal lesion, slightly increased when compared with adjacent hepatic parenchyma. Malignancy cannot be excluded. Follow-up as clinically indicated.\par - The patient is status post left adrenalectomy. There is no focal abnormal FDG activity in the left adrenal bed.\par \par CT chest on 09/27/2022: ([facility])\par - There are no visualized pulmonary parenchymal abnormalities.\par - Possible small hiatal hernia.\par - Mild coronary artery calcifications.\par - Postsurgical changes from prior left adrenalectomy.\par \par I have reviewed the patient's medical records and diagnostic images at time of this office consultation and have made the following recommendation:\par 1. CT chest and PET/CT reviewed and explained to patient, I recommended patient to f/u cardioloist for ECHO to r/o pulmonary HTN, recommended ENT and rheumatology evaluation, and RTC in 1 year with CT chest w/o contrast. \par \par I personally performed the services described in the documentation, reviewed the documentation recorded by the scribe in my presence and it accurately and completely records my words and actions.\par \par I, Aisha Moncada NP, am scribing for and the presence of ROXANA Hsieh, the following sections HISTORY OF PRESENT ILLNESS, PAST MEDICAL/FAMILY/SOCIAL HISTORY; REVIEW OF SYSTEMS; VITAL SIGNS; PHYSICAL EXAM; DISPOSITION.